# Patient Record
Sex: FEMALE | Race: WHITE | NOT HISPANIC OR LATINO | Employment: OTHER | URBAN - METROPOLITAN AREA
[De-identification: names, ages, dates, MRNs, and addresses within clinical notes are randomized per-mention and may not be internally consistent; named-entity substitution may affect disease eponyms.]

---

## 2025-01-14 ENCOUNTER — HOSPITAL ENCOUNTER (INPATIENT)
Facility: HOSPITAL | Age: OVER 89
LOS: 3 days | Discharge: HOME | DRG: 438 | End: 2025-01-17
Attending: EMERGENCY MEDICINE | Admitting: STUDENT IN AN ORGANIZED HEALTH CARE EDUCATION/TRAINING PROGRAM
Payer: MEDICARE

## 2025-01-14 ENCOUNTER — APPOINTMENT (OUTPATIENT)
Dept: RADIOLOGY | Facility: HOSPITAL | Age: OVER 89
DRG: 438 | End: 2025-01-14
Payer: MEDICARE

## 2025-01-14 ENCOUNTER — APPOINTMENT (OUTPATIENT)
Dept: CARDIOLOGY | Facility: HOSPITAL | Age: OVER 89
DRG: 438 | End: 2025-01-14
Payer: MEDICARE

## 2025-01-14 DIAGNOSIS — A09 GASTROINTESTINAL INFECTION: ICD-10-CM

## 2025-01-14 DIAGNOSIS — J18.9 COMMUNITY ACQUIRED PNEUMONIA, UNSPECIFIED LATERALITY: Primary | ICD-10-CM

## 2025-01-14 DIAGNOSIS — E83.42 HYPOMAGNESEMIA: ICD-10-CM

## 2025-01-14 DIAGNOSIS — K85.80 OTHER ACUTE PANCREATITIS, UNSPECIFIED COMPLICATION STATUS (HHS-HCC): ICD-10-CM

## 2025-01-14 DIAGNOSIS — I48.91 ATRIAL FIBRILLATION, UNSPECIFIED TYPE (MULTI): ICD-10-CM

## 2025-01-14 LAB
ALBUMIN SERPL BCP-MCNC: 2.8 G/DL (ref 3.4–5)
ALP SERPL-CCNC: 35 U/L (ref 33–136)
ALT SERPL W P-5'-P-CCNC: 8 U/L (ref 7–45)
ANION GAP SERPL CALC-SCNC: 14 MMOL/L (ref 10–20)
APPEARANCE UR: CLEAR
APTT PPP: 28 SECONDS (ref 27–38)
AST SERPL W P-5'-P-CCNC: 17 U/L (ref 9–39)
ATRIAL RATE: 97 BPM
BASOPHILS # BLD AUTO: 0.09 X10*3/UL (ref 0–0.1)
BASOPHILS NFR BLD AUTO: 0.4 %
BILIRUB SERPL-MCNC: 1.5 MG/DL (ref 0–1.2)
BILIRUB UR STRIP.AUTO-MCNC: NEGATIVE MG/DL
BNP SERPL-MCNC: 338 PG/ML (ref 0–99)
BUN SERPL-MCNC: 22 MG/DL (ref 6–23)
CALCIUM SERPL-MCNC: 8.1 MG/DL (ref 8.6–10.3)
CARDIAC TROPONIN I PNL SERPL HS: 10 NG/L (ref 0–13)
CARDIAC TROPONIN I PNL SERPL HS: 12 NG/L (ref 0–13)
CHLORIDE SERPL-SCNC: 105 MMOL/L (ref 98–107)
CO2 SERPL-SCNC: 23 MMOL/L (ref 21–32)
COLOR UR: YELLOW
CREAT SERPL-MCNC: 0.78 MG/DL (ref 0.5–1.05)
EGFRCR SERPLBLD CKD-EPI 2021: 71 ML/MIN/1.73M*2
EOSINOPHIL # BLD AUTO: 0.04 X10*3/UL (ref 0–0.4)
EOSINOPHIL NFR BLD AUTO: 0.2 %
ERYTHROCYTE [DISTWIDTH] IN BLOOD BY AUTOMATED COUNT: 13.9 % (ref 11.5–14.5)
ETHANOL SERPL-MCNC: <10 MG/DL
GLUCOSE SERPL-MCNC: 87 MG/DL (ref 74–99)
GLUCOSE UR STRIP.AUTO-MCNC: NORMAL MG/DL
HCT VFR BLD AUTO: 35 % (ref 36–46)
HGB BLD-MCNC: 11.5 G/DL (ref 12–16)
HOLD SPECIMEN: NORMAL
HOLD SPECIMEN: NORMAL
IMM GRANULOCYTES # BLD AUTO: 0.11 X10*3/UL (ref 0–0.5)
IMM GRANULOCYTES NFR BLD AUTO: 0.5 % (ref 0–0.9)
INR PPP: 1.6 (ref 0.9–1.1)
KETONES UR STRIP.AUTO-MCNC: ABNORMAL MG/DL
LACTATE SERPL-SCNC: 1.5 MMOL/L (ref 0.4–2)
LACTATE SERPL-SCNC: 2.7 MMOL/L (ref 0.4–2)
LEUKOCYTE ESTERASE UR QL STRIP.AUTO: NEGATIVE
LIPASE SERPL-CCNC: 34 U/L (ref 9–82)
LYMPHOCYTES # BLD AUTO: 0.46 X10*3/UL (ref 0.8–3)
LYMPHOCYTES NFR BLD AUTO: 2 %
MAGNESIUM SERPL-MCNC: 1.41 MG/DL (ref 1.6–2.4)
MCH RBC QN AUTO: 30 PG (ref 26–34)
MCHC RBC AUTO-ENTMCNC: 32.9 G/DL (ref 32–36)
MCV RBC AUTO: 91 FL (ref 80–100)
MONOCYTES # BLD AUTO: 0.72 X10*3/UL (ref 0.05–0.8)
MONOCYTES NFR BLD AUTO: 3.2 %
MRSA DNA SPEC QL NAA+PROBE: NOT DETECTED
MUCOUS THREADS #/AREA URNS AUTO: NORMAL /LPF
NEUTROPHILS # BLD AUTO: 21.02 X10*3/UL (ref 1.6–5.5)
NEUTROPHILS NFR BLD AUTO: 93.7 %
NITRITE UR QL STRIP.AUTO: NEGATIVE
NRBC BLD-RTO: 0 /100 WBCS (ref 0–0)
PH UR STRIP.AUTO: 5.5 [PH]
PLATELET # BLD AUTO: 300 X10*3/UL (ref 150–450)
POTASSIUM SERPL-SCNC: 4 MMOL/L (ref 3.5–5.3)
PROT SERPL-MCNC: 5.6 G/DL (ref 6.4–8.2)
PROT UR STRIP.AUTO-MCNC: ABNORMAL MG/DL
PROTHROMBIN TIME: 17.7 SECONDS (ref 9.8–12.8)
Q ONSET: 207 MS
QRS COUNT: 15 BEATS
QRS DURATION: 112 MS
QT INTERVAL: 376 MS
QTC CALCULATION(BAZETT): 464 MS
QTC FREDERICIA: 433 MS
R AXIS: 5 DEGREES
RBC # BLD AUTO: 3.83 X10*6/UL (ref 4–5.2)
RBC # UR STRIP.AUTO: NEGATIVE /UL
RBC #/AREA URNS AUTO: NORMAL /HPF
SODIUM SERPL-SCNC: 138 MMOL/L (ref 136–145)
SP GR UR STRIP.AUTO: >1.05
SQUAMOUS #/AREA URNS AUTO: NORMAL /HPF
T AXIS: 99 DEGREES
T OFFSET: 395 MS
UROBILINOGEN UR STRIP.AUTO-MCNC: NORMAL MG/DL
VENTRICULAR RATE: 92 BPM
WBC # BLD AUTO: 22.4 X10*3/UL (ref 4.4–11.3)
WBC #/AREA URNS AUTO: NORMAL /HPF

## 2025-01-14 PROCEDURE — 70450 CT HEAD/BRAIN W/O DYE: CPT | Performed by: RADIOLOGY

## 2025-01-14 PROCEDURE — 1210000001 HC SEMI-PRIVATE ROOM DAILY

## 2025-01-14 PROCEDURE — 85025 COMPLETE CBC W/AUTO DIFF WBC: CPT

## 2025-01-14 PROCEDURE — 36415 COLL VENOUS BLD VENIPUNCTURE: CPT

## 2025-01-14 PROCEDURE — 2550000001 HC RX 255 CONTRASTS

## 2025-01-14 PROCEDURE — 93970 EXTREMITY STUDY: CPT

## 2025-01-14 PROCEDURE — 83880 ASSAY OF NATRIURETIC PEPTIDE: CPT

## 2025-01-14 PROCEDURE — 71275 CT ANGIOGRAPHY CHEST: CPT | Mod: FOREIGN READ | Performed by: RADIOLOGY

## 2025-01-14 PROCEDURE — 87641 MR-STAPH DNA AMP PROBE: CPT

## 2025-01-14 PROCEDURE — 71275 CT ANGIOGRAPHY CHEST: CPT

## 2025-01-14 PROCEDURE — 85730 THROMBOPLASTIN TIME PARTIAL: CPT

## 2025-01-14 PROCEDURE — 84484 ASSAY OF TROPONIN QUANT: CPT

## 2025-01-14 PROCEDURE — 2500000004 HC RX 250 GENERAL PHARMACY W/ HCPCS (ALT 636 FOR OP/ED)

## 2025-01-14 PROCEDURE — 83605 ASSAY OF LACTIC ACID: CPT

## 2025-01-14 PROCEDURE — 83690 ASSAY OF LIPASE: CPT

## 2025-01-14 PROCEDURE — 96374 THER/PROPH/DIAG INJ IV PUSH: CPT | Mod: 59

## 2025-01-14 PROCEDURE — 70450 CT HEAD/BRAIN W/O DYE: CPT

## 2025-01-14 PROCEDURE — 74177 CT ABD & PELVIS W/CONTRAST: CPT | Mod: FOREIGN READ | Performed by: RADIOLOGY

## 2025-01-14 PROCEDURE — 71045 X-RAY EXAM CHEST 1 VIEW: CPT

## 2025-01-14 PROCEDURE — 99285 EMERGENCY DEPT VISIT HI MDM: CPT | Mod: 25 | Performed by: EMERGENCY MEDICINE

## 2025-01-14 PROCEDURE — 51701 INSERT BLADDER CATHETER: CPT

## 2025-01-14 PROCEDURE — 74177 CT ABD & PELVIS W/CONTRAST: CPT

## 2025-01-14 PROCEDURE — 99223 1ST HOSP IP/OBS HIGH 75: CPT | Performed by: STUDENT IN AN ORGANIZED HEALTH CARE EDUCATION/TRAINING PROGRAM

## 2025-01-14 PROCEDURE — 82077 ASSAY SPEC XCP UR&BREATH IA: CPT

## 2025-01-14 PROCEDURE — 83735 ASSAY OF MAGNESIUM: CPT

## 2025-01-14 PROCEDURE — 87075 CULTR BACTERIA EXCEPT BLOOD: CPT | Mod: ELYLAB

## 2025-01-14 PROCEDURE — 93971 EXTREMITY STUDY: CPT | Mod: FOREIGN READ | Performed by: RADIOLOGY

## 2025-01-14 PROCEDURE — 71045 X-RAY EXAM CHEST 1 VIEW: CPT | Mod: FOREIGN READ | Performed by: RADIOLOGY

## 2025-01-14 PROCEDURE — 2500000004 HC RX 250 GENERAL PHARMACY W/ HCPCS (ALT 636 FOR OP/ED): Mod: JZ | Performed by: STUDENT IN AN ORGANIZED HEALTH CARE EDUCATION/TRAINING PROGRAM

## 2025-01-14 PROCEDURE — 96361 HYDRATE IV INFUSION ADD-ON: CPT | Mod: 59

## 2025-01-14 PROCEDURE — 80053 COMPREHEN METABOLIC PANEL: CPT

## 2025-01-14 PROCEDURE — 93005 ELECTROCARDIOGRAM TRACING: CPT

## 2025-01-14 PROCEDURE — 81001 URINALYSIS AUTO W/SCOPE: CPT | Performed by: STUDENT IN AN ORGANIZED HEALTH CARE EDUCATION/TRAINING PROGRAM

## 2025-01-14 PROCEDURE — 96365 THER/PROPH/DIAG IV INF INIT: CPT | Mod: 59

## 2025-01-14 RX ORDER — ACETAMINOPHEN 160 MG/5ML
650 SOLUTION ORAL EVERY 4 HOURS PRN
Status: DISCONTINUED | OUTPATIENT
Start: 2025-01-14 | End: 2025-01-17 | Stop reason: HOSPADM

## 2025-01-14 RX ORDER — AMOXICILLIN 250 MG
1 CAPSULE ORAL 2 TIMES DAILY
Status: ON HOLD | COMMUNITY
Start: 2025-01-01 | End: 2025-01-15

## 2025-01-14 RX ORDER — VANCOMYCIN HYDROCHLORIDE 1 G/200ML
1 INJECTION, SOLUTION INTRAVENOUS ONCE
Status: COMPLETED | OUTPATIENT
Start: 2025-01-14 | End: 2025-01-14

## 2025-01-14 RX ORDER — NYSTATIN 100000 [USP'U]/G
1 POWDER TOPICAL 2 TIMES DAILY
Status: DISCONTINUED | OUTPATIENT
Start: 2025-01-14 | End: 2025-01-17 | Stop reason: HOSPADM

## 2025-01-14 RX ORDER — MIRTAZAPINE 15 MG/1
1 TABLET, FILM COATED ORAL NIGHTLY
COMMUNITY
Start: 2025-01-10

## 2025-01-14 RX ORDER — NYSTATIN 100000 U/G
1 OINTMENT TOPICAL 2 TIMES DAILY
Status: ON HOLD | COMMUNITY
Start: 2024-11-20 | End: 2025-01-15

## 2025-01-14 RX ORDER — POLYETHYLENE GLYCOL 3350 17 G/17G
34 POWDER, FOR SOLUTION ORAL
Status: ON HOLD | COMMUNITY
Start: 2025-01-02 | End: 2025-01-15

## 2025-01-14 RX ORDER — METRONIDAZOLE 500 MG/100ML
500 INJECTION, SOLUTION INTRAVENOUS EVERY 8 HOURS
Status: DISCONTINUED | OUTPATIENT
Start: 2025-01-14 | End: 2025-01-17 | Stop reason: HOSPADM

## 2025-01-14 RX ORDER — METOPROLOL SUCCINATE 25 MG/1
37.5 TABLET, EXTENDED RELEASE ORAL DAILY
COMMUNITY

## 2025-01-14 RX ORDER — CLOTRIMAZOLE AND BETAMETHASONE DIPROPIONATE 10; .64 MG/G; MG/G
1 CREAM TOPICAL 2 TIMES DAILY
Status: ON HOLD | COMMUNITY
Start: 2024-09-05 | End: 2025-01-15

## 2025-01-14 RX ORDER — POLYETHYLENE GLYCOL 3350 17 G/17G
17 POWDER, FOR SOLUTION ORAL DAILY PRN
Status: DISCONTINUED | OUTPATIENT
Start: 2025-01-14 | End: 2025-01-17 | Stop reason: HOSPADM

## 2025-01-14 RX ORDER — ROSUVASTATIN CALCIUM 10 MG/1
10 TABLET, COATED ORAL NIGHTLY
COMMUNITY
Start: 2025-01-02

## 2025-01-14 RX ORDER — ACETAMINOPHEN 650 MG/1
650 SUPPOSITORY RECTAL EVERY 4 HOURS PRN
Status: DISCONTINUED | OUTPATIENT
Start: 2025-01-14 | End: 2025-01-17 | Stop reason: HOSPADM

## 2025-01-14 RX ORDER — GENTAMICIN SULFATE 1 MG/G
OINTMENT TOPICAL 3 TIMES DAILY
Status: ON HOLD | COMMUNITY
Start: 2024-11-20 | End: 2025-01-15

## 2025-01-14 RX ORDER — OMEPRAZOLE 20 MG/1
20 TABLET, DELAYED RELEASE ORAL
COMMUNITY

## 2025-01-14 RX ORDER — ASPIRIN 325 MG
325 TABLET ORAL DAILY
Status: ON HOLD | COMMUNITY
End: 2025-01-15

## 2025-01-14 RX ORDER — MAGNESIUM SULFATE HEPTAHYDRATE 40 MG/ML
2 INJECTION, SOLUTION INTRAVENOUS ONCE
Status: COMPLETED | OUTPATIENT
Start: 2025-01-14 | End: 2025-01-14

## 2025-01-14 RX ORDER — ACETAMINOPHEN 325 MG/1
650 TABLET ORAL EVERY 4 HOURS PRN
Status: DISCONTINUED | OUTPATIENT
Start: 2025-01-14 | End: 2025-01-17 | Stop reason: HOSPADM

## 2025-01-14 RX ORDER — ACETAMINOPHEN 500 MG
5 TABLET ORAL NIGHTLY PRN
Status: DISCONTINUED | OUTPATIENT
Start: 2025-01-14 | End: 2025-01-17 | Stop reason: HOSPADM

## 2025-01-14 RX ORDER — ALUMINUM HYDROXIDE, MAGNESIUM HYDROXIDE, AND SIMETHICONE 1200; 120; 1200 MG/30ML; MG/30ML; MG/30ML
30 SUSPENSION ORAL EVERY 6 HOURS PRN
Status: ON HOLD | COMMUNITY
Start: 2025-01-01 | End: 2025-01-15

## 2025-01-14 RX ADMIN — MAGNESIUM SULFATE HEPTAHYDRATE 2 G: 40 INJECTION, SOLUTION INTRAVENOUS at 16:41

## 2025-01-14 RX ADMIN — METRONIDAZOLE 500 MG: 500 INJECTION, SOLUTION INTRAVENOUS at 22:06

## 2025-01-14 RX ADMIN — VANCOMYCIN HYDROCHLORIDE 1 G: 1 INJECTION, SOLUTION INTRAVENOUS at 17:46

## 2025-01-14 RX ADMIN — IOHEXOL 75 ML: 350 INJECTION, SOLUTION INTRAVENOUS at 16:09

## 2025-01-14 RX ADMIN — AZITHROMYCIN MONOHYDRATE 490.2 MG: 500 INJECTION, POWDER, LYOPHILIZED, FOR SOLUTION INTRAVENOUS at 17:25

## 2025-01-14 RX ADMIN — SODIUM CHLORIDE 500 ML: 9 INJECTION, SOLUTION INTRAVENOUS at 14:39

## 2025-01-14 RX ADMIN — CEFEPIME 2 G: 2 INJECTION, POWDER, FOR SOLUTION INTRAVENOUS at 16:40

## 2025-01-14 SDOH — ECONOMIC STABILITY: HOUSING INSECURITY: AT ANY TIME IN THE PAST 12 MONTHS, WERE YOU HOMELESS OR LIVING IN A SHELTER (INCLUDING NOW)?: NO

## 2025-01-14 SDOH — SOCIAL STABILITY: SOCIAL INSECURITY: DOES ANYONE TRY TO KEEP YOU FROM HAVING/CONTACTING OTHER FRIENDS OR DOING THINGS OUTSIDE YOUR HOME?: NO

## 2025-01-14 SDOH — SOCIAL STABILITY: SOCIAL INSECURITY: WITHIN THE LAST YEAR, HAVE YOU BEEN HUMILIATED OR EMOTIONALLY ABUSED IN OTHER WAYS BY YOUR PARTNER OR EX-PARTNER?: NO

## 2025-01-14 SDOH — ECONOMIC STABILITY: FOOD INSECURITY: HOW HARD IS IT FOR YOU TO PAY FOR THE VERY BASICS LIKE FOOD, HOUSING, MEDICAL CARE, AND HEATING?: NOT VERY HARD

## 2025-01-14 SDOH — SOCIAL STABILITY: SOCIAL INSECURITY: ARE YOU OR HAVE YOU BEEN THREATENED OR ABUSED PHYSICALLY, EMOTIONALLY, OR SEXUALLY BY ANYONE?: NO

## 2025-01-14 SDOH — SOCIAL STABILITY: SOCIAL INSECURITY: WERE YOU ABLE TO COMPLETE ALL THE BEHAVIORAL HEALTH SCREENINGS?: YES

## 2025-01-14 SDOH — SOCIAL STABILITY: SOCIAL INSECURITY
WITHIN THE LAST YEAR, HAVE YOU BEEN RAPED OR FORCED TO HAVE ANY KIND OF SEXUAL ACTIVITY BY YOUR PARTNER OR EX-PARTNER?: NO

## 2025-01-14 SDOH — SOCIAL STABILITY: SOCIAL INSECURITY: HAVE YOU HAD THOUGHTS OF HARMING ANYONE ELSE?: NO

## 2025-01-14 SDOH — SOCIAL STABILITY: SOCIAL INSECURITY: WITHIN THE LAST YEAR, HAVE YOU BEEN AFRAID OF YOUR PARTNER OR EX-PARTNER?: NO

## 2025-01-14 SDOH — ECONOMIC STABILITY: TRANSPORTATION INSECURITY: IN THE PAST 12 MONTHS, HAS LACK OF TRANSPORTATION KEPT YOU FROM MEDICAL APPOINTMENTS OR FROM GETTING MEDICATIONS?: NO

## 2025-01-14 SDOH — SOCIAL STABILITY: SOCIAL INSECURITY: ARE THERE ANY APPARENT SIGNS OF INJURIES/BEHAVIORS THAT COULD BE RELATED TO ABUSE/NEGLECT?: NO

## 2025-01-14 SDOH — ECONOMIC STABILITY: INCOME INSECURITY: IN THE PAST 12 MONTHS HAS THE ELECTRIC, GAS, OIL, OR WATER COMPANY THREATENED TO SHUT OFF SERVICES IN YOUR HOME?: NO

## 2025-01-14 SDOH — ECONOMIC STABILITY: HOUSING INSECURITY: IN THE LAST 12 MONTHS, WAS THERE A TIME WHEN YOU WERE NOT ABLE TO PAY THE MORTGAGE OR RENT ON TIME?: NO

## 2025-01-14 SDOH — SOCIAL STABILITY: SOCIAL INSECURITY: HAVE YOU HAD ANY THOUGHTS OF HARMING ANYONE ELSE?: NO

## 2025-01-14 SDOH — SOCIAL STABILITY: SOCIAL INSECURITY
WITHIN THE LAST YEAR, HAVE YOU BEEN KICKED, HIT, SLAPPED, OR OTHERWISE PHYSICALLY HURT BY YOUR PARTNER OR EX-PARTNER?: NO

## 2025-01-14 SDOH — ECONOMIC STABILITY: FOOD INSECURITY: WITHIN THE PAST 12 MONTHS, THE FOOD YOU BOUGHT JUST DIDN'T LAST AND YOU DIDN'T HAVE MONEY TO GET MORE.: NEVER TRUE

## 2025-01-14 SDOH — SOCIAL STABILITY: SOCIAL INSECURITY: ABUSE: ADULT

## 2025-01-14 SDOH — ECONOMIC STABILITY: FOOD INSECURITY: WITHIN THE PAST 12 MONTHS, YOU WORRIED THAT YOUR FOOD WOULD RUN OUT BEFORE YOU GOT THE MONEY TO BUY MORE.: NEVER TRUE

## 2025-01-14 SDOH — ECONOMIC STABILITY: HOUSING INSECURITY: IN THE PAST 12 MONTHS, HOW MANY TIMES HAVE YOU MOVED WHERE YOU WERE LIVING?: 0

## 2025-01-14 SDOH — SOCIAL STABILITY: SOCIAL INSECURITY: DO YOU FEEL ANYONE HAS EXPLOITED OR TAKEN ADVANTAGE OF YOU FINANCIALLY OR OF YOUR PERSONAL PROPERTY?: NO

## 2025-01-14 SDOH — SOCIAL STABILITY: SOCIAL INSECURITY: DO YOU FEEL UNSAFE GOING BACK TO THE PLACE WHERE YOU ARE LIVING?: NO

## 2025-01-14 SDOH — SOCIAL STABILITY: SOCIAL INSECURITY: HAS ANYONE EVER THREATENED TO HURT YOUR FAMILY OR YOUR PETS?: NO

## 2025-01-14 ASSESSMENT — COGNITIVE AND FUNCTIONAL STATUS - GENERAL
HELP NEEDED FOR BATHING: A LITTLE
DRESSING REGULAR UPPER BODY CLOTHING: A LITTLE
PATIENT BASELINE BEDBOUND: NO
MOVING TO AND FROM BED TO CHAIR: A LOT
WALKING IN HOSPITAL ROOM: A LOT
MOVING FROM LYING ON BACK TO SITTING ON SIDE OF FLAT BED WITH BEDRAILS: A LITTLE
CLIMB 3 TO 5 STEPS WITH RAILING: A LOT
DAILY ACTIVITIY SCORE: 19
DRESSING REGULAR LOWER BODY CLOTHING: A LITTLE
STANDING UP FROM CHAIR USING ARMS: A LOT
MOBILITY SCORE: 14
TOILETING: A LOT
TURNING FROM BACK TO SIDE WHILE IN FLAT BAD: A LITTLE

## 2025-01-14 ASSESSMENT — ACTIVITIES OF DAILY LIVING (ADL)
LACK_OF_TRANSPORTATION: NO
DRESSING YOURSELF: NEEDS ASSISTANCE
ADEQUATE_TO_COMPLETE_ADL: YES
FEEDING YOURSELF: INDEPENDENT
HEARING - RIGHT EAR: FUNCTIONAL
ASSISTIVE_DEVICE: NONE;WALKER
BATHING: NEEDS ASSISTANCE
GROOMING: NEEDS ASSISTANCE
JUDGMENT_ADEQUATE_SAFELY_COMPLETE_DAILY_ACTIVITIES: YES
HEARING - LEFT EAR: FUNCTIONAL
WALKS IN HOME: INDEPENDENT
TOILETING: NEEDS ASSISTANCE
PATIENT'S MEMORY ADEQUATE TO SAFELY COMPLETE DAILY ACTIVITIES?: NO

## 2025-01-14 ASSESSMENT — PAIN - FUNCTIONAL ASSESSMENT: PAIN_FUNCTIONAL_ASSESSMENT: 0-10

## 2025-01-14 ASSESSMENT — PATIENT HEALTH QUESTIONNAIRE - PHQ9
1. LITTLE INTEREST OR PLEASURE IN DOING THINGS: SEVERAL DAYS
SUM OF ALL RESPONSES TO PHQ9 QUESTIONS 1 & 2: 2
2. FEELING DOWN, DEPRESSED OR HOPELESS: SEVERAL DAYS

## 2025-01-14 ASSESSMENT — PAIN SCALES - GENERAL
PAINLEVEL_OUTOF10: 0 - NO PAIN
PAINLEVEL_OUTOF10: 0 - NO PAIN

## 2025-01-14 ASSESSMENT — LIFESTYLE VARIABLES
HAVE PEOPLE ANNOYED YOU BY CRITICIZING YOUR DRINKING: NO
TOTAL SCORE: 0
HOW MANY STANDARD DRINKS CONTAINING ALCOHOL DO YOU HAVE ON A TYPICAL DAY: PATIENT DOES NOT DRINK
HOW OFTEN DO YOU HAVE 6 OR MORE DRINKS ON ONE OCCASION: NEVER
AUDIT-C TOTAL SCORE: 0
SKIP TO QUESTIONS 9-10: 1
EVER HAD A DRINK FIRST THING IN THE MORNING TO STEADY YOUR NERVES TO GET RID OF A HANGOVER: NO
HOW OFTEN DO YOU HAVE A DRINK CONTAINING ALCOHOL: NEVER
AUDIT-C TOTAL SCORE: 0
EVER FELT BAD OR GUILTY ABOUT YOUR DRINKING: NO
HAVE YOU EVER FELT YOU SHOULD CUT DOWN ON YOUR DRINKING: NO

## 2025-01-14 ASSESSMENT — COLUMBIA-SUICIDE SEVERITY RATING SCALE - C-SSRS
1. IN THE PAST MONTH, HAVE YOU WISHED YOU WERE DEAD OR WISHED YOU COULD GO TO SLEEP AND NOT WAKE UP?: NO
2. HAVE YOU ACTUALLY HAD ANY THOUGHTS OF KILLING YOURSELF?: NO
6. HAVE YOU EVER DONE ANYTHING, STARTED TO DO ANYTHING, OR PREPARED TO DO ANYTHING TO END YOUR LIFE?: NO

## 2025-01-14 NOTE — H&P
Medical Group History and Physical  ASSESSMENT & PLAN:     Acute pancreatitis  Hemorrhagic pancreatic pseudocyst  - Presenting from home with chief complaints of generalized weakness, shortness of breath however does have abdominal pain and tenderness to deep palpation  - CT abdomen/pelvis with concerns for acute pancreatitis along with hemorrhagic pseudocyst and smaller pseudocyst  - GI consulted  - Continue IV antibiotics at this time to cover for pancreatic necrosis  until further evaluation with cefepime, metronidazole    Pulmonary fibrosis  - As seen on CTA chest  - Pulmonology consulted for further assessment, will likely need outpatient PFTs etc    Alcohol use disorder  - Has not drink any alcohol since her most recent admission at [facility]    Leukocytosis, likely reactive to above    Essential hypertension  Hyperlipidemia  Type II DM  - Resume home medications once reconciled    Spoke with patient's 2 daughters at bedside.    VTE Prophylaxis: SCDs (holding chemoprophylaxis in setting of hemorrhagic pseudocyst)      ---Of note, this documentation is completed using the Dragon Dictation system (voice recognition software). There may be spelling and/or grammatical errors that were not corrected prior to final submission.---    Brady Betancourt MD    HISTORY OF PRESENT ILLNESS:   Chief Complaint: SOB    History Of Present Illness:    Mary Burden is a 93 y.o. female with a significant past medical history of GERD, previous alcohol use disorder, hypertension, hyperlipidemia, recent admission at [facility] for acute pancreatitis presented from home with chief complaints of shortness of breath, weakness, on exam patient endorses that her shortness of breath is improved with oxygen.  States having some abdominal pain which has improved.  Denies having any alcohol use since her discharge from [facility] on 1/1.    Recent hospitalization, [facility] 12/28 to 1/1:  Admitted for acute pancreatitis likely in setting of  "alcohol use.  Also found to have cholelithiasis.  Seen by GI/general surgery who recommended outpatient follow-up and further intervention.  CT abdomen/pelvis at that time showed acute pancreatitis otherwise unremarkable.    ED course:  - Vitals: Temperature 97.3, , /87, RR 18 saturating 91% on 2 L nasal cannula  - Labs: WBC count of 22 otherwise unremarkable CBC.  Bilirubin at 1.5 magnesium 1.4 otherwise unremarkable CMP.  Lactate of 2.7 which improved to 1.5.  Lipase of 34.  - Imaging: CT abdomen/pelvis with IV contrast with acute pancreatitis, also with a large 5.2 x 4.4 x 9.9 cm hemorrhagic pseudocyst with other smaller pseudocyst seen in the pancreatic head.  CTA chest negative for PE.  Interstitial markings and chronic fibrosis seen.  Thoracic aorta dilatation also seen.     Review of systems: 10 point review of systems is otherwise negative except as mentioned above.    PAST HISTORIES:     Past Medical History:  She has a past medical history of Hypertension and Pancreatitis (HHS-HCC).    Past Surgical History:  She has no past surgical history on file.      Social History:  She reports that she has never smoked. She has never been exposed to tobacco smoke. She has never used smokeless tobacco. She reports that she does not currently use alcohol. She reports that she does not use drugs.    Family History:  No family history on file.     Allergies:  Amoxicillin and Codeine    OBJECTIVE:     Last Recorded Vitals:  Vitals:    01/14/25 1419 01/14/25 1424 01/14/25 1646 01/14/25 1749   BP: 134/87  114/59 101/55   BP Location: Right arm  Right arm Right arm   Patient Position: Lying  Lying Lying   Pulse: (!) 113  82 87   Resp: 20  20 20   Temp: 36.3 °C (97.3 °F)      TempSrc: Temporal      SpO2: (!) 91% (!) 91% 100% 100%   Weight: 45.4 kg (100 lb)      Height: 1.6 m (5' 3\")        Last I/O:  No intake/output data recorded.    Physical Exam  Vitals reviewed.   Constitutional:       General: She is not in " acute distress.     Appearance: Normal appearance. She is not toxic-appearing.   HENT:      Head: Normocephalic and atraumatic.      Nose: Nose normal.      Mouth/Throat:      Mouth: Mucous membranes are moist.      Pharynx: Oropharynx is clear.      Comments: No upper teeth, lower teeth missing and have cavities/decay.  Eyes:      Extraocular Movements: Extraocular movements intact.      Conjunctiva/sclera: Conjunctivae normal.      Pupils: Pupils are equal, round, and reactive to light.   Cardiovascular:      Rate and Rhythm: Normal rate and regular rhythm.      Pulses: Normal pulses.      Heart sounds: Normal heart sounds.   Pulmonary:      Effort: Pulmonary effort is normal. No respiratory distress.      Breath sounds: No wheezing.      Comments: No acute respiratory distress.  Breath sounds diminished to auscultation bilaterally.  Abdominal:      General: Bowel sounds are normal. There is no distension.      Palpations: Abdomen is soft.      Tenderness: There is abdominal tenderness. There is no guarding.      Comments: Abdomen soft, bowel sounds are present.  Diffusely tender to deep palpation.  No rebound or guarding.   Musculoskeletal:         General: Normal range of motion.      Cervical back: Normal range of motion and neck supple.   Neurological:      General: No focal deficit present.      Mental Status: She is alert and oriented to person, place, and time. Mental status is at baseline.   Psychiatric:         Mood and Affect: Mood normal.         Behavior: Behavior normal.         Thought Content: Thought content normal.         Scheduled Medications  azithromycin, 500 mg, intravenous, Once  magnesium sulfate, 2 g, intravenous, Once  vancomycin, 1 g, intravenous, Once      PRN Medications    Continuous Medications       Outpatient Medications:  Prior to Admission medications    Not on File       LABS AND IMAGING:     Labs:  Results from last 7 days   Lab Units 01/14/25  1425   WBC AUTO x10*3/uL 22.4*    RBC AUTO x10*6/uL 3.83*   HEMOGLOBIN g/dL 11.5*   HEMATOCRIT % 35.0*   MCV fL 91   MCH pg 30.0   MCHC g/dL 32.9   RDW % 13.9   PLATELETS AUTO x10*3/uL 300     Results from last 7 days   Lab Units 01/14/25  1425   SODIUM mmol/L 138   POTASSIUM mmol/L 4.0   CHLORIDE mmol/L 105   CO2 mmol/L 23   BUN mg/dL 22   CREATININE mg/dL 0.78   GLUCOSE mg/dL 87   PROTEIN TOTAL g/dL 5.6*   CALCIUM mg/dL 8.1*   BILIRUBIN TOTAL mg/dL 1.5*   ALK PHOS U/L 35   AST U/L 17   ALT U/L 8     Results from last 7 days   Lab Units 01/14/25  1425   MAGNESIUM mg/dL 1.41*     Results from last 7 days   Lab Units 01/14/25  1620 01/14/25  1425   TROPHS ng/L 12 10       Imaging:  CT abdomen pelvis w IV contrast  Narrative: STUDY:  CT Angiogram of the Chest, CT Abdomen and Pelvis with IV Contrast;  01/14/2025 at 4:12 PM  INDICATION:  Shortness of breath. Leg swelling. Lower abdominal pain.   COMPARISON:  XR chest 01/14/25.  ACCESSION NUMBER(S):  [id number], [id number]  ORDERING CLINICIAN:  MARK VOGEL  TECHNIQUE:  CTA of the chest was performed following rapid injection  of intravenous contrast.  Images are reviewed and processed at a  workstation according to the CT angiogram protocol with 3-D and/or MIP  post processing imaging generated.  CT of the abdomen and pelvis was  performed with intravenous contrast.  Omnipaque-350 75 mL was  administered intravenously; positive oral contrast was given.  Automated mA/kV exposure control was utilized and patient examination  was performed in strict accordance with principles of ALARA.  FINDINGS:    No filling defects are seen within the main pulmonary artery, left or  right pulmonary arteries, or first order branches to indicate an  embolus.  No early filling veins are visualized indicate an  arteriovenous malformation.  Emphysematous changes are seen within the  lungs bilaterally.  There are peripheral interstitial markings  bilaterally, which may indicate a chronic process such as fibrosis.    There is a tiny right pleural effusion.  No enlarged lymph nodes are  seen in the mediastinal or hilar regions.  There is no pericardial  effusion.  Coronary artery calcifications are present.  There is  aneurysmal dilatation of the ascending aorta at 4.3 x 4.0 cm.  The  aortic arch is measured at 3.4 cm.  The proximal descending thoracic  aorta is measured at 3.2 cm.  The distal descending thoracic aorta is  measured at 2.6 cm.  Degenerative changes are seen throughout the  thoracic spine.    There is a nodular contour of the liver, indicative of cirrhosis.   There appears be partial cavernous transformation of the portal vein.  The hepatic veins appear patent.  Gallstones are present.  There is  moderate distention of the gallbladder.  The spleen is not enlarged.   There appears to be chronic thrombosis of the splenic vein.  There are  inflammatory changes surrounding the pancreas, indicative of acute  hepatitis.  There are multiple fluid collections are visualized,  indicative of cirrhosis.  There appears be a hemorrhagic pseudocyst  posterior to the head measured at 5.2 x 4.4 x 9.9 cm(Series 602, Image  58; Series 601, Image 53).  The hemorrhagic component is seen in the  superior aspect of the pseudocyst is estimated 1.8 x 2.0 cm.  A  smaller pseudocyst is seen anterior to the pancreatic head measured at  1.5 x 1.6 cm(Series 601, Image 49).  No adrenal nodule is noted.  No  acute findings are seen within either kidney.  There is a mild to  moderate amount of abdominal ascites.  No dilated loops of small bowel  or colon are visually.  There are diverticula seen throughout the  colon.  There is no evidence of diverticulitis.  The appendix is  unremarkable in appearance.  No enlarged lymph nodes are seen in the  pelvic sidewalls, iliac chains, or retroperitoneum.  There is  aneurysmal dilatation of the abdominal aorta at 3.2 cm.  No prominent  edema is seen within the psoas musculature.  There is  generalized  anasarca.  Degenerative changes are seen throughout the lumbar spine.  Impression: CHEST:  1.  No evidence of a pulmonary embolus.  2.  Emphysematous changes.  3.  Peripheral interstitial markings, most likely a chronic process  such as fibrosis.  4.  Tiny right pleural effusion.  5.  Coronary artery calcifications.  6.  Aneurysmal dilatation of the thoracic aorta.  ABDOMEN/PELVIS:  1.  Acute pancreatitis, with development of pseudocysts off of the  pancreatic head.  The larger pseudocyst displays a small focus of  internal hemorrhage.  Correlation with serum CBC is recommended.  2.  Cirrhotic morphology of the liver, with cavernous transformation  of the portal vein.  3.  Chronic thrombosis of the splenic vein.  4.  Cholelithiasis.  5.  Mild to moderate abdominal ascites.  6.  Anasarca.  7.  Aneurysmal dilatation of the abdominal aorta.  8.  Colonic diverticulosis, no evidence of diverticulitis.  This report was called to Dr. Mark Vogel at 4:55 PM on January 14, 2025.  Signed by Jonathan Farias MD  CT angio chest for pulmonary embolism  Narrative: STUDY:  CT Angiogram of the Chest, CT Abdomen and Pelvis with IV Contrast;  01/14/2025 at 4:12 PM  INDICATION:  Shortness of breath. Leg swelling. Lower abdominal pain.   COMPARISON:  XR chest 01/14/25.  ACCESSION NUMBER(S):  [id number], [id number]  ORDERING CLINICIAN:  MARK VOGEL  TECHNIQUE:  CTA of the chest was performed following rapid injection  of intravenous contrast.  Images are reviewed and processed at a  workstation according to the CT angiogram protocol with 3-D and/or MIP  post processing imaging generated.  CT of the abdomen and pelvis was  performed with intravenous contrast.  Omnipaque-350 75 mL was  administered intravenously; positive oral contrast was given.  Automated mA/kV exposure control was utilized and patient examination  was performed in strict accordance with principles of ALARA.  FINDINGS:    No filling defects are  seen within the main pulmonary artery, left or  right pulmonary arteries, or first order branches to indicate an  embolus.  No early filling veins are visualized indicate an  arteriovenous malformation.  Emphysematous changes are seen within the  lungs bilaterally.  There are peripheral interstitial markings  bilaterally, which may indicate a chronic process such as fibrosis.   There is a tiny right pleural effusion.  No enlarged lymph nodes are  seen in the mediastinal or hilar regions.  There is no pericardial  effusion.  Coronary artery calcifications are present.  There is  aneurysmal dilatation of the ascending aorta at 4.3 x 4.0 cm.  The  aortic arch is measured at 3.4 cm.  The proximal descending thoracic  aorta is measured at 3.2 cm.  The distal descending thoracic aorta is  measured at 2.6 cm.  Degenerative changes are seen throughout the  thoracic spine.    There is a nodular contour of the liver, indicative of cirrhosis.   There appears be partial cavernous transformation of the portal vein.  The hepatic veins appear patent.  Gallstones are present.  There is  moderate distention of the gallbladder.  The spleen is not enlarged.   There appears to be chronic thrombosis of the splenic vein.  There are  inflammatory changes surrounding the pancreas, indicative of acute  hepatitis.  There are multiple fluid collections are visualized,  indicative of cirrhosis.  There appears be a hemorrhagic pseudocyst  posterior to the head measured at 5.2 x 4.4 x 9.9 cm(Series 602, Image  58; Series 601, Image 53).  The hemorrhagic component is seen in the  superior aspect of the pseudocyst is estimated 1.8 x 2.0 cm.  A  smaller pseudocyst is seen anterior to the pancreatic head measured at  1.5 x 1.6 cm(Series 601, Image 49).  No adrenal nodule is noted.  No  acute findings are seen within either kidney.  There is a mild to  moderate amount of abdominal ascites.  No dilated loops of small bowel  or colon are visually.   There are diverticula seen throughout the  colon.  There is no evidence of diverticulitis.  The appendix is  unremarkable in appearance.  No enlarged lymph nodes are seen in the  pelvic sidewalls, iliac chains, or retroperitoneum.  There is  aneurysmal dilatation of the abdominal aorta at 3.2 cm.  No prominent  edema is seen within the psoas musculature.  There is generalized  anasarca.  Degenerative changes are seen throughout the lumbar spine.  Impression: CHEST:  1.  No evidence of a pulmonary embolus.  2.  Emphysematous changes.  3.  Peripheral interstitial markings, most likely a chronic process  such as fibrosis.  4.  Tiny right pleural effusion.  5.  Coronary artery calcifications.  6.  Aneurysmal dilatation of the thoracic aorta.  ABDOMEN/PELVIS:  1.  Acute pancreatitis, with development of pseudocysts off of the  pancreatic head.  The larger pseudocyst displays a small focus of  internal hemorrhage.  Correlation with serum CBC is recommended.  2.  Cirrhotic morphology of the liver, with cavernous transformation  of the portal vein.  3.  Chronic thrombosis of the splenic vein.  4.  Cholelithiasis.  5.  Mild to moderate abdominal ascites.  6.  Anasarca.  7.  Aneurysmal dilatation of the abdominal aorta.  8.  Colonic diverticulosis, no evidence of diverticulitis.  This report was called to Dr. Mark Vogel at 4:55 PM on January 14, 2025.  Signed by Jonathan Farias MD  CT head wo IV contrast  Narrative: Interpreted By:  Marina Hawthorne,   STUDY:  CT HEAD WO IV CONTRAST; ;  1/14/2025 4:14 pm      INDICATION:  Signs/Symptoms:tingling hands.      COMPARISON:  01/30/2007      ACCESSION NUMBER(S):  [id number]      ORDERING CLINICIAN:  MARK VOGEL      TECHNIQUE:  Serial axial images of the head were obtained without intravenous  contrast. Sagittal and coronal reconstructions were generated.      FINDINGS:  The ventricles are midline and normal in size.      There are no acute parenchymal abnormalities.      There  is no hemorrhage or extra-axial fluid.      There is no obvious scalp hematoma or skull fracture.      The visualized portions of the paranasal sinuses and mastoids are  unremarkable.      There is no obvious skull fracture or scalp hematoma.      COMPARISON OF FINDINGS:  The brain is similar.      Impression: No acute intracranial abnormality.          MACRO:  none      Signed by: Marina Hawthorne 1/14/2025 4:17 PM  Dictation workstation:   [id number]  Vascular US lower extremity venous duplex bilateral  Narrative: STUDY:  Bilateral Lower Extremity Venous Doppler Ultrasound; 1/14/2025 3:32 PM  INDICATION:  BLE edema.  COMPARISON:  None available.  ACCESSION NUMBER(S):  [id number]  ORDERING CLINICIAN:  MARK VOGEL  TECHNIQUE:    Real-time grayscale imaging, color Doppler flow imaging, and spectral  Doppler imaging of the bilateral lower extremity veins was performed.  FINDINGS:   The bilateral common femoral, profunda femoral, femoral and popliteal  veins demonstrated normal compressibility, normal phasic venous flow  and normal response to augmentation.  There is no evidence for  echogenic thrombi.  The visualized deep calf veins are patent.    Impression: No evidence for DVT within the bilateral lower extremities.  Signed by Javad Graff MD  XR chest 1 view  Narrative: STUDY:  Chest Radiograph;  1/14/2025 2:27PM  INDICATION:  Shortness of breath.  COMPARISON:  None Available  ACCESSION NUMBER(S):  [id number]  ORDERING CLINICIAN:  MARK VOGEL  TECHNIQUE:  Frontal chest was obtained at 14:25 hours.  FINDINGS:  CARDIOMEDIASTINAL SILHOUETTE:  Cardiomediastinal silhouette is limits of normal for technique..     LUNGS:  There is infiltrate in the right lower lobe.  There is blunting the  right costophrenic angle.     ABDOMEN:  No remarkable upper abdominal findings.     BONES:  No acute osseous changes.  There is an old fracture the right  clavicle.  Impression: Right lower lobe infiltrate is small right  pleural effusion.  Signed by Raymond Gillis MD  ECG 12 lead  Atrial fibrillation  Nonspecific ST and T wave abnormality  Abnormal ECG  No previous ECGs available

## 2025-01-14 NOTE — ED PROVIDER NOTES
HPI   No chief complaint on file.      History provided by: Patient    Limitations to history: None    CC: Shortness of breath    HPI: 93-year-old female with a history of alcohol use, alcohol-induced pancreatitis, prolonged QT syndrome, hypertension, hyperlipidemia presents the emergency department to be evaluated for shortness of breath.  Patient states that for the last few days has been feeling short of breath with exertion.  She denies cough runny nose, congestion, sore throat.  Denies fever and chills.  She denies history of CAD, she is not sure when her last stress test or catheterization was but she has never required stent placement or bypass.  She denies history of heart failure or the use of diuretics, she does report some swelling in her lower extremities but denies redness warmth or bruising.  Denies numbness or tingling.  Denies history of DVTs or PEs, denies recent plane flights, recent surgeries, history of cancer, use of estrogen.  Denies pleuritic pain or hemoptysis.  Denies palpitations or having chest pain.  Patient denies history of asthma or the use of breathing treatments.  Patient states that earlier today she also felt generally shaky and was having some tingling in her bilateral hands.  Denies numbness but denies weakness in the extremities.  Denies slurred speech or facial drooping.  Denies all GI and  complaints.  Denies all other systemic symptoms.    ROS: Negative unless mentioned in HPI    Medical Hx: Denies allergies.  Immunizations up-to-date.    Physical exam:    Constitutional: Patient is well-nourished and well-developed.  Sitting comfortably in the room and in no distress.  Oriented to person, place, time, and situation.    HEENT: Head is normocephalic, atraumatic. Patient's airway is patent.  Tympanic membranes are clear bilaterally.  Nasal mucosa clear.  Mouth with normal mucosa.  Throat is not erythematous and there are no oropharyngeal exudates, uvula is midline.  No  obvious facial deformities.    Eyes: Clear bilaterally.  Pupils are equal round and reactive to light and accommodation.  Extraocular movements intact.      Cardiac: Regular rate, irregular rhythm.  Heart sounds S1, S2.  No murmurs, rubs, or gallops.  PMI nondisplaced.  No JVD.    Respiratory: Regular respiratory rate and effort.  Breath sounds are clear and equal bilaterally, no adventitious lung sounds.  Patient is speaking in full sentences and is in no apparent respiratory distress. No use of accessory muscles.      Gastrointestinal: Abdomen is soft, nondistended, and nontender.  There are no obvious deformities.  No rebound tenderness or guarding.  Bowel sounds are normal active.    Genitourinary: No CVA or flank tenderness.    Musculoskeletal: No reproducible tenderness.  No obvious skin or bony deformities.  Patient has equal range of motion in all extremities and no strength deficiencies.  No muscle or joint tenderness. No back or neck tenderness.  Capillary refill less than 3 seconds.  Strong peripheral pulses.  No sensory deficits.    Neurological: Patient is alert and oriented.  No focal deficits.  5/5 strength in all extremities.  Cranial nerves II through XII intact. GCS15.  NIH 0.    Skin: Skin is normal color for race and is warm, dry, and intact.  No evidence of trauma.  No lesions, rashes, bruising, jaundice, or masses.    Psych: Appropriate mood and affect.  No apparent risk to self or others.    Heme/lymph: No adenopathy, lymphadenopathy, or splenomegaly    Physical exam is otherwise negative unless stated above or in history of present illness.              Patient History   No past medical history on file.  No past surgical history on file.  No family history on file.  Social History     Tobacco Use    Smoking status: Not on file    Smokeless tobacco: Not on file   Substance Use Topics    Alcohol use: Not on file    Drug use: Not on file       Physical Exam   ED Triage Vitals   Temp Pulse Resp  BP   -- -- -- --      SpO2 Temp src Heart Rate Source Patient Position   -- -- -- --      BP Location FiO2 (%)     -- --       Physical Exam      ED Course & MDM   Diagnoses as of 01/14/25 1705   Community acquired pneumonia, unspecified laterality   Hypomagnesemia   Atrial fibrillation, unspecified type (Multi)        Patient updated on plan for lab testing, IV insertion, radiology imaging, and medications to be administered while in the ER (if indicated). Patient updated on expected wait times for testing and results. Patient provided my name and told to ask any staff member for questions or concerns if they should arise. Electronic medical record reviewed.     MDM    Upon initial assessment, patient was healthy non-toxic appearing and in no apparent distress.     Patient presented to the emergency department with the chief complaint shortness of breath, shakiness, tingling in the upper extremities.  Patient has no neurological deficits.  NIH is 0.  Breath sounds are clear and equal bilaterally, 91 to 93% on room air.  No acute respiratory distress.  Patient's heart rhythm was irregular but rate was within normal limits.  On arrival to the emergency department, vital signs were within normal limits    Will obtain basic blood work, EKG and troponin, chest x-ray, BNP, coagulation screen, ethanol level given the patient's history, urinalysis, CTA to rule out PE given the patient's shortness of breath and leg swelling, and a duplex ultrasound to rule out a DVT.    Patient's EKG was performed at 1410 and interpreted by me.  It appears to be most consistent with a flutter 92 beats minute.  Normal axis.  There is no ST elevation or depression.  No prolonged QT.  Patient does not have any documented history of a flutter.    Original and repeat troponin within normal limits making ACS unlikely.  BNP is 338.  Lipase is 34.  Lactate is originally elevated but improved to 1.5 after fluids.  Given the patient's tachycardia,  hypoxia, and elevated lactate blood cultures were obtained.  Ethanol is within normal limits.  CBC reveals a leukocytosis 22 with a left shift neutrophil count.  CBC also reveals a slightly more progressed normocytic anemia.  Chest x-ray reveals findings concerning for pneumonia so she was started on IV antibiotics based on her allergies.  Ultrasound revealed no DVT and CT of the head revealed no obvious signs of infarction or bleeding.    CTA of the chest reveals no PE.  There is emphysematous changes and likely fibrotic changes as well as a tiny right pleural effusion.  Patient also has a known dilation of the thoracic aorta and coronary artery calcifications.  CT abdomen and pelvis shows pancreatitis with a likely pancreatic pseudocyst with potential internal hemorrhage.  This appears to be new as this was not discussed in the patient's most previous CT abdomen and pelvis in late December.  Patient also has cirrhotic morphology of the liver with cavernous transformation of the portal vein and a chronic thrombus of the splenic vein.  Patient also has cholelithiasis without signs of cholecystitis.  She has mild abdominal ascites and cholelithiasis.  She also has an [place] and diverticulosis without diverticulitis.    At this time I do believe the patient would benefit from hospitalization for IV antibiotics for sepsis and new onset A-fib.  I spoke to the hospitalist who is agreeable with this plan.  Patient remained hemodynamically stable while in the emergency department.      This note was dictated using a speech recognition program.  While an attempt was made at proof-reading to minimize errors, minor errors in transcription may be present         No data recorded                                 Medical Decision Making      Procedure  Procedures     Ollie Bernstein PA-C  01/14/25 4599

## 2025-01-15 LAB
ALBUMIN SERPL BCP-MCNC: 2.8 G/DL (ref 3.4–5)
ALP SERPL-CCNC: 33 U/L (ref 33–136)
ALT SERPL W P-5'-P-CCNC: 6 U/L (ref 7–45)
ANION GAP SERPL CALC-SCNC: 15 MMOL/L (ref 10–20)
AST SERPL W P-5'-P-CCNC: 18 U/L (ref 9–39)
BASOPHILS # BLD AUTO: 0.09 X10*3/UL (ref 0–0.1)
BASOPHILS NFR BLD AUTO: 0.4 %
BILIRUB DIRECT SERPL-MCNC: 0.5 MG/DL (ref 0–0.3)
BILIRUB SERPL-MCNC: 1.5 MG/DL (ref 0–1.2)
BUN SERPL-MCNC: 22 MG/DL (ref 6–23)
CALCIUM SERPL-MCNC: 8.1 MG/DL (ref 8.6–10.3)
CHLORIDE SERPL-SCNC: 104 MMOL/L (ref 98–107)
CO2 SERPL-SCNC: 21 MMOL/L (ref 21–32)
CREAT SERPL-MCNC: 0.74 MG/DL (ref 0.5–1.05)
EGFRCR SERPLBLD CKD-EPI 2021: 76 ML/MIN/1.73M*2
EOSINOPHIL # BLD AUTO: 0.03 X10*3/UL (ref 0–0.4)
EOSINOPHIL NFR BLD AUTO: 0.1 %
ERYTHROCYTE [DISTWIDTH] IN BLOOD BY AUTOMATED COUNT: 14 % (ref 11.5–14.5)
GLUCOSE SERPL-MCNC: 87 MG/DL (ref 74–99)
HCT VFR BLD AUTO: 33.4 % (ref 36–46)
HGB BLD-MCNC: 11.2 G/DL (ref 12–16)
HOLD SPECIMEN: NORMAL
HOLD SPECIMEN: NORMAL
IMM GRANULOCYTES # BLD AUTO: 0.19 X10*3/UL (ref 0–0.5)
IMM GRANULOCYTES NFR BLD AUTO: 0.9 % (ref 0–0.9)
LYMPHOCYTES # BLD AUTO: 0.59 X10*3/UL (ref 0.8–3)
LYMPHOCYTES NFR BLD AUTO: 2.7 %
MAGNESIUM SERPL-MCNC: 1.97 MG/DL (ref 1.6–2.4)
MCH RBC QN AUTO: 30.4 PG (ref 26–34)
MCHC RBC AUTO-ENTMCNC: 33.5 G/DL (ref 32–36)
MCV RBC AUTO: 91 FL (ref 80–100)
MONOCYTES # BLD AUTO: 1.05 X10*3/UL (ref 0.05–0.8)
MONOCYTES NFR BLD AUTO: 4.8 %
NEUTROPHILS # BLD AUTO: 20.1 X10*3/UL (ref 1.6–5.5)
NEUTROPHILS NFR BLD AUTO: 91.1 %
NRBC BLD-RTO: 0 /100 WBCS (ref 0–0)
PLATELET # BLD AUTO: 223 X10*3/UL (ref 150–450)
POTASSIUM SERPL-SCNC: 3.9 MMOL/L (ref 3.5–5.3)
PROT SERPL-MCNC: 5.5 G/DL (ref 6.4–8.2)
RBC # BLD AUTO: 3.68 X10*6/UL (ref 4–5.2)
SODIUM SERPL-SCNC: 136 MMOL/L (ref 136–145)
TRIGL SERPL-MCNC: 68 MG/DL (ref 0–149)
WBC # BLD AUTO: 22.1 X10*3/UL (ref 4.4–11.3)

## 2025-01-15 PROCEDURE — 99233 SBSQ HOSP IP/OBS HIGH 50: CPT | Performed by: STUDENT IN AN ORGANIZED HEALTH CARE EDUCATION/TRAINING PROGRAM

## 2025-01-15 PROCEDURE — 2500000001 HC RX 250 WO HCPCS SELF ADMINISTERED DRUGS (ALT 637 FOR MEDICARE OP): Performed by: STUDENT IN AN ORGANIZED HEALTH CARE EDUCATION/TRAINING PROGRAM

## 2025-01-15 PROCEDURE — 83735 ASSAY OF MAGNESIUM: CPT | Performed by: STUDENT IN AN ORGANIZED HEALTH CARE EDUCATION/TRAINING PROGRAM

## 2025-01-15 PROCEDURE — 97165 OT EVAL LOW COMPLEX 30 MIN: CPT | Mod: GO

## 2025-01-15 PROCEDURE — 84478 ASSAY OF TRIGLYCERIDES: CPT | Performed by: NURSE PRACTITIONER

## 2025-01-15 PROCEDURE — 85025 COMPLETE CBC W/AUTO DIFF WBC: CPT | Performed by: STUDENT IN AN ORGANIZED HEALTH CARE EDUCATION/TRAINING PROGRAM

## 2025-01-15 PROCEDURE — 2500000002 HC RX 250 W HCPCS SELF ADMINISTERED DRUGS (ALT 637 FOR MEDICARE OP, ALT 636 FOR OP/ED): Performed by: STUDENT IN AN ORGANIZED HEALTH CARE EDUCATION/TRAINING PROGRAM

## 2025-01-15 PROCEDURE — 2500000004 HC RX 250 GENERAL PHARMACY W/ HCPCS (ALT 636 FOR OP/ED): Performed by: NURSE PRACTITIONER

## 2025-01-15 PROCEDURE — 2500000001 HC RX 250 WO HCPCS SELF ADMINISTERED DRUGS (ALT 637 FOR MEDICARE OP): Performed by: NURSE PRACTITIONER

## 2025-01-15 PROCEDURE — 2500000004 HC RX 250 GENERAL PHARMACY W/ HCPCS (ALT 636 FOR OP/ED): Performed by: STUDENT IN AN ORGANIZED HEALTH CARE EDUCATION/TRAINING PROGRAM

## 2025-01-15 PROCEDURE — 80053 COMPREHEN METABOLIC PANEL: CPT | Performed by: STUDENT IN AN ORGANIZED HEALTH CARE EDUCATION/TRAINING PROGRAM

## 2025-01-15 PROCEDURE — 1210000001 HC SEMI-PRIVATE ROOM DAILY

## 2025-01-15 PROCEDURE — 99222 1ST HOSP IP/OBS MODERATE 55: CPT | Performed by: NURSE PRACTITIONER

## 2025-01-15 PROCEDURE — 97161 PT EVAL LOW COMPLEX 20 MIN: CPT | Mod: GP

## 2025-01-15 PROCEDURE — 36415 COLL VENOUS BLD VENIPUNCTURE: CPT | Performed by: STUDENT IN AN ORGANIZED HEALTH CARE EDUCATION/TRAINING PROGRAM

## 2025-01-15 PROCEDURE — 82248 BILIRUBIN DIRECT: CPT | Performed by: STUDENT IN AN ORGANIZED HEALTH CARE EDUCATION/TRAINING PROGRAM

## 2025-01-15 RX ORDER — METOPROLOL SUCCINATE 25 MG/1
37.5 TABLET, EXTENDED RELEASE ORAL DAILY
Status: DISCONTINUED | OUTPATIENT
Start: 2025-01-15 | End: 2025-01-17 | Stop reason: HOSPADM

## 2025-01-15 RX ORDER — PANTOPRAZOLE SODIUM 40 MG/1
40 TABLET, DELAYED RELEASE ORAL
Status: DISCONTINUED | OUTPATIENT
Start: 2025-01-15 | End: 2025-01-15

## 2025-01-15 RX ORDER — CALCIUM CARBONATE 200(500)MG
500 TABLET,CHEWABLE ORAL 4 TIMES DAILY PRN
Status: DISCONTINUED | OUTPATIENT
Start: 2025-01-15 | End: 2025-01-17 | Stop reason: HOSPADM

## 2025-01-15 RX ORDER — SODIUM CHLORIDE, SODIUM LACTATE, POTASSIUM CHLORIDE, CALCIUM CHLORIDE 600; 310; 30; 20 MG/100ML; MG/100ML; MG/100ML; MG/100ML
100 INJECTION, SOLUTION INTRAVENOUS CONTINUOUS
Status: ACTIVE | OUTPATIENT
Start: 2025-01-15 | End: 2025-01-16

## 2025-01-15 RX ORDER — MIRTAZAPINE 15 MG/1
15 TABLET, FILM COATED ORAL NIGHTLY
Status: DISCONTINUED | OUTPATIENT
Start: 2025-01-15 | End: 2025-01-17 | Stop reason: HOSPADM

## 2025-01-15 RX ORDER — PANTOPRAZOLE SODIUM 40 MG/1
40 TABLET, DELAYED RELEASE ORAL 2 TIMES DAILY
Status: DISCONTINUED | OUTPATIENT
Start: 2025-01-15 | End: 2025-01-17 | Stop reason: HOSPADM

## 2025-01-15 RX ORDER — ROSUVASTATIN CALCIUM 10 MG/1
10 TABLET, COATED ORAL NIGHTLY
Status: DISCONTINUED | OUTPATIENT
Start: 2025-01-15 | End: 2025-01-17 | Stop reason: HOSPADM

## 2025-01-15 RX ORDER — PANTOPRAZOLE SODIUM 40 MG/10ML
40 INJECTION, POWDER, LYOPHILIZED, FOR SOLUTION INTRAVENOUS 2 TIMES DAILY
Status: DISCONTINUED | OUTPATIENT
Start: 2025-01-15 | End: 2025-01-17 | Stop reason: HOSPADM

## 2025-01-15 RX ADMIN — CEFEPIME 1 G: 1 INJECTION, POWDER, FOR SOLUTION INTRAMUSCULAR; INTRAVENOUS at 17:05

## 2025-01-15 RX ADMIN — PANTOPRAZOLE SODIUM 40 MG: 40 TABLET, DELAYED RELEASE ORAL at 20:40

## 2025-01-15 RX ADMIN — PANTOPRAZOLE SODIUM 40 MG: 40 TABLET, DELAYED RELEASE ORAL at 10:13

## 2025-01-15 RX ADMIN — METRONIDAZOLE 500 MG: 500 INJECTION, SOLUTION INTRAVENOUS at 04:33

## 2025-01-15 RX ADMIN — ROSUVASTATIN CALCIUM 10 MG: 10 TABLET, FILM COATED ORAL at 20:36

## 2025-01-15 RX ADMIN — NYSTATIN 1 APPLICATION: 100000 POWDER TOPICAL at 20:37

## 2025-01-15 RX ADMIN — METRONIDAZOLE 500 MG: 500 INJECTION, SOLUTION INTRAVENOUS at 20:36

## 2025-01-15 RX ADMIN — METOPROLOL SUCCINATE 37.5 MG: 25 TABLET, EXTENDED RELEASE ORAL at 09:41

## 2025-01-15 RX ADMIN — ANTACID TABLETS 500 MG: 500 TABLET, CHEWABLE ORAL at 20:40

## 2025-01-15 RX ADMIN — SODIUM CHLORIDE, SODIUM LACTATE, POTASSIUM CHLORIDE, AND CALCIUM CHLORIDE 100 ML/HR: 600; 310; 30; 20 INJECTION, SOLUTION INTRAVENOUS at 19:36

## 2025-01-15 RX ADMIN — NYSTATIN 1 APPLICATION: 100000 POWDER TOPICAL at 10:11

## 2025-01-15 RX ADMIN — SODIUM CHLORIDE, POTASSIUM CHLORIDE, SODIUM LACTATE AND CALCIUM CHLORIDE 100 ML: 600; 310; 30; 20 INJECTION, SOLUTION INTRAVENOUS at 17:03

## 2025-01-15 RX ADMIN — CEFEPIME 1 G: 1 INJECTION, POWDER, FOR SOLUTION INTRAMUSCULAR; INTRAVENOUS at 05:39

## 2025-01-15 RX ADMIN — MIRTAZAPINE 15 MG: 15 TABLET, FILM COATED ORAL at 20:36

## 2025-01-15 RX ADMIN — METRONIDAZOLE 500 MG: 500 INJECTION, SOLUTION INTRAVENOUS at 13:09

## 2025-01-15 ASSESSMENT — COGNITIVE AND FUNCTIONAL STATUS - GENERAL
MOBILITY SCORE: 18
TURNING FROM BACK TO SIDE WHILE IN FLAT BAD: A LITTLE
TOILETING: A LOT
MOVING TO AND FROM BED TO CHAIR: A LITTLE
MOVING FROM LYING ON BACK TO SITTING ON SIDE OF FLAT BED WITH BEDRAILS: A LITTLE
HELP NEEDED FOR BATHING: A LITTLE
MOVING TO AND FROM BED TO CHAIR: A LITTLE
MOVING TO AND FROM BED TO CHAIR: A LITTLE
TURNING FROM BACK TO SIDE WHILE IN FLAT BAD: A LITTLE
DRESSING REGULAR LOWER BODY CLOTHING: A LITTLE
DAILY ACTIVITIY SCORE: 20
DRESSING REGULAR LOWER BODY CLOTHING: A LITTLE
CLIMB 3 TO 5 STEPS WITH RAILING: A LOT
TOILETING: A LITTLE
PERSONAL GROOMING: A LITTLE
DAILY ACTIVITIY SCORE: 19
DAILY ACTIVITIY SCORE: 20
WALKING IN HOSPITAL ROOM: A LITTLE
STANDING UP FROM CHAIR USING ARMS: A LOT
STANDING UP FROM CHAIR USING ARMS: A LITTLE
MOVING FROM LYING ON BACK TO SITTING ON SIDE OF FLAT BED WITH BEDRAILS: A LITTLE
MOBILITY SCORE: 16
TOILETING: A LOT
HELP NEEDED FOR BATHING: A LITTLE
TOILETING: A LITTLE
WALKING IN HOSPITAL ROOM: A LITTLE
MOVING TO AND FROM BED TO CHAIR: A LOT
DRESSING REGULAR UPPER BODY CLOTHING: A LITTLE
TURNING FROM BACK TO SIDE WHILE IN FLAT BAD: A LITTLE
MOVING FROM LYING ON BACK TO SITTING ON SIDE OF FLAT BED WITH BEDRAILS: A LITTLE
HELP NEEDED FOR BATHING: A LITTLE
DRESSING REGULAR LOWER BODY CLOTHING: A LITTLE
WALKING IN HOSPITAL ROOM: A LOT
CLIMB 3 TO 5 STEPS WITH RAILING: A LITTLE
MOBILITY SCORE: 18
DAILY ACTIVITIY SCORE: 19
TURNING FROM BACK TO SIDE WHILE IN FLAT BAD: A LITTLE
DRESSING REGULAR UPPER BODY CLOTHING: A LITTLE
DRESSING REGULAR LOWER BODY CLOTHING: A LITTLE
CLIMB 3 TO 5 STEPS WITH RAILING: TOTAL
DRESSING REGULAR UPPER BODY CLOTHING: A LITTLE
WALKING IN HOSPITAL ROOM: A LITTLE
CLIMB 3 TO 5 STEPS WITH RAILING: A LITTLE
STANDING UP FROM CHAIR USING ARMS: A LITTLE
STANDING UP FROM CHAIR USING ARMS: A LITTLE
HELP NEEDED FOR BATHING: A LITTLE
MOVING FROM LYING ON BACK TO SITTING ON SIDE OF FLAT BED WITH BEDRAILS: A LITTLE
MOBILITY SCORE: 14

## 2025-01-15 ASSESSMENT — PAIN SCALES - GENERAL
PAINLEVEL_OUTOF10: 0 - NO PAIN

## 2025-01-15 ASSESSMENT — PAIN SCALES - WONG BAKER: WONGBAKER_NUMERICALRESPONSE: NO HURT

## 2025-01-15 ASSESSMENT — PAIN - FUNCTIONAL ASSESSMENT
PAIN_FUNCTIONAL_ASSESSMENT: 0-10
PAIN_FUNCTIONAL_ASSESSMENT: 0-10

## 2025-01-15 ASSESSMENT — ACTIVITIES OF DAILY LIVING (ADL): BATHING_ASSISTANCE: STAND BY

## 2025-01-15 NOTE — PROGRESS NOTES
Physical Therapy    Physical Therapy Evaluation    Patient Name: Mary Burden  MRN: [id number]  Today's Date: 1/15/2025   Time Calculation  Start Time: 1014  Stop Time: 1032  Time Calculation (min): 18 min  1012/1012-A    Assessment/Plan   PT Assessment  PT Assessment Results: Decreased strength, Decreased endurance, Impaired balance, Decreased mobility, Decreased coordination  Rehab Prognosis: Good  Evaluation/Treatment Tolerance: Patient limited by fatigue  Barriers to Participation: Comorbidities  End of Session Communication: Bedside nurse  Assessment Comment: pt with decreased mobility /gait strength balance endurance pt to benefit from skilled PT to address deficits and improve functional mobility  End of Session Patient Position: Up in chair, Alarm on  IP OR SWING BED PT PLAN  Inpatient or Swing Bed: Inpatient  PT Plan  Treatment/Interventions: Bed mobility, Transfer training, Gait training, Stair training, Balance training, Strengthening, Endurance training, Therapeutic exercise, Therapeutic activity  PT Plan: Ongoing PT  PT Frequency: 3 times per week  PT Discharge Recommendations: Low intensity level of continued care  PT Recommended Transfer Status: Assist x1, Assistive device  PT - OK to Discharge: Yes (once medically ready for discharge to next level of care.)    Subjective     Current Problem:  1. Community acquired pneumonia, unspecified laterality        2. Hypomagnesemia        3. Atrial fibrillation, unspecified type (Multi)          Patient Active Problem List   Diagnosis    Community acquired pneumonia, unspecified laterality     General Visit Information:  General  Reason for Referral: weakness/ impaired mobility  Referred By: OT/PT Serafin 1/14  Past Medical History Relevant to Rehab: HLD,HTN,DM2, GERD, Pulmonary fiborsis, alcohol use disorder.  Co-Treatment: OT  Co-Treatment Reason: to maximize pt safety  Prior to Session Communication: Bedside nurse (cleared to see for therapy evmary ann)  Patient  Position Received: Bed, 3 rail up, Alarm on  General Comment: pt is a 94 yo female  [facility] on 1/14 with reports of SOB and weakness.  dcx : community aquired PNA and acute pancreatitis  test/ labs: BNP : 338, CXR :  Right lower lobe infiltrates, small R pleural effusion.  US BLEs neg DVT,  CT angio chest/ CT ABD :  neg PE ,  pulmonary fibrosis,  acute pancreatitis , cirrhosis of the liver, moderate ascites, CT head neg .  Home Living:  Home Living  Home Living Comments: pt lives alone in a 1 level home.  3 steps with HR to enter.  pt has a tub/shower with seat.  laundry on main floor.  Prior Level of Function:  Prior Function Per Pt/Caregiver Report  Prior Function Comments: per pt IND with mobility and gait has a QC she takes out in community .  also has a walker available.  mod I with adls and assistance with iadls.  no falls still drives  Precautions:  Precautions  Medical Precautions: Fall precautions  Vital Signs:  Objective   Pain:  Pain Assessment  Pain Assessment: 0-10  0-10 (Numeric) Pain Score: 0 - No pain  Cognition:  Cognition  Overall Cognitive Status: Within Functional Limits  General Assessments:  Activity Tolerance  Endurance: Tolerates less than 10 min exercise, no significant change in vital signs  Sensation  Sensation Comment: intact BLEs  Strength  Strength Comments: BLE 4+/5     Coordination  Movements are Fluid and Coordinated: Yes     Static Sitting Balance  Static Sitting-Comment/Number of Minutes: good  Dynamic Sitting Balance  Dynamic Sitting-Comments: good  Static Standing Balance  Static Standing-Comment/Number of Minutes: fair  Dynamic Standing Balance  Dynamic Standing-Comments: fair  Functional Assessments:  Bed Mobility  Bed Mobility: Yes  Bed Mobility 1  Bed Mobility 1: Supine to sitting, Scooting  Level of Assistance 1: Contact guard  Bed Mobility Comments 1: cga to EOB . cues to scoot out put feet on floor.  Transfers  Transfer: Yes  Transfer 1  Technique 1: Sit to  stand, Stand to sit  Transfer Device 1: Walker (FWW)  Transfer Level of Assistance 1: Contact guard  Trials/Comments 1: cga with FWW cues to push up and reach back with transfers .  Ambulation/Gait Training  Ambulation/Gait Training Performed: Yes  Ambulation/Gait Training 1  Surface 1: Level tile  Device 1: Rolling walker  Assistance 1: Minimum assistance, Minimal verbal cues  Quality of Gait 1: Forward flexed posture, Inconsistent stride length, Decreased step length  Comments/Distance (ft) 1: 50ft with FWW MIN A cues to stay in TIM of walker.  Extremity/Trunk Assessments:  RLE   RLE : Within Functional Limits  LLE   LLE : Within Functional Limits  Outcome Measures:     [facility] Basic Mobility  Turning from your back to your side while in a flat bed without using bedrails: A little  Moving from lying on your back to sitting on the side of a flat bed without using bedrails: A little  Moving to and from bed to chair (including a wheelchair): A little  Standing up from a chair using your arms (e.g. wheelchair or bedside chair): A little  To walk in hospital room: A little  Climbing 3-5 steps with railing: Total  Basic Mobility - Total Score: 16  Goals:  Encounter Problems       Encounter Problems (Active)       PT Problem       Pt will demonstrate mod I  with bed mobility to edge of bed.         Start:  01/15/25    Expected End:  01/29/25            Pt will demonstrate mod I  with sit to stand/chair transfers with FWW.         Start:  01/15/25    Expected End:  01/29/25            Pt will ambulate 100 feet with FWW MOD I .         Start:  01/15/25    Expected End:  01/29/25            Pt to demo 3 steps with  rails with SUP        Start:  01/15/25    Expected End:  01/29/25               Pain - Adult            Education Documentation  Mobility Training, taught by Ahmet Mayer, PT at 1/15/2025 11:54 AM.  Learner: Patient  Readiness: Acceptance  Method: Explanation  Response: Verbalizes Understanding    Education  Comments  No comments found.

## 2025-01-15 NOTE — NURSING NOTE
At 1050, Martha Newton CNP was in to assess patient and to discuss plan of care. Daughter was present at time of visit.

## 2025-01-15 NOTE — CONSULTS
"Nutrition Initial Assessment:   Nutrition Assessment    Reason for Assessment: Admission nursing screening    Patient is a 93 y.o. female presenting with community acquired pneumonia  past medical history of GERD, previous alcohol use disorder, hypertension, hyperlipidemia, recent admission at [facility] for acute pancreatitis presented from home with chief complaints of shortness of breath, weakness, on exam patient endorses that her shortness of breath is improved with oxygen.       Nutrition History:  Energy Intake:  (no meal intakes recorded at this time)  Food and Nutrient History: RDN consult for MST score of 2. Met with pt, daughter at bedside. Pt denies recent wt changes but states unsure of UBW range. Daughter reports wt loss over the past year, states she lost alot when she had COVID, states UBW prior to wt loss was ~120-130 lbs. Pt states she has not had an appetite for a while, reports she was drinking 3 Boost per day but since admission in the end of December has not been eating much or drinking much Boost. Pt denies GI symptoms at this time. Reports missing teeth, has difficulty chewing hard testures and reports some difficulty swallowing pills. Pt agreeable to trial of Gelatine and Ensure Clear. Will continue to monitor.  Vitamin/Herbal Supplement Use: none per home med list  Food Allergies/Intolerances:  None  GI Symptoms: None  Oral Problems: Chewing difficulty       Anthropometrics:  Height: 160 cm (5' 3\")   Weight: 45.4 kg (100 lb)   BMI (Calculated): 17.72  IBW/kg (Dietitian Calculated): 52.3 kg  Percent of IBW: 87 %       Weight History:   Wt Readings from Last 10 Encounters:   01/14/25 45.4 kg (100 lb)   09/05/24 48.5 kg (107 lbs)      Weight Change %:  Weight History / % Weight Change: per chart review, pt with 7 lb, 6.5% nonsignificant wt loss in 4 months  Significant Weight Loss: No    Nutrition Focused Physical Exam Findings:    Subcutaneous Fat Loss:   Orbital Fat Pads: Mild-Moderate (slight " "dark circles and slight hollowing)  Buccal Fat Pads: Severe (hollow, sunken and narrow face)  Muscle Wasting:  Temporalis: Severe (hollowed scooping depression)  Pectoralis (Clavicular Region): Severe (protruding prominent clavicle)  Deltoid/Trapezius: Severe (squared shoulders, acromion process prominent)  Edema:  Edema: +1 trace  Edema Location: BLE  Physical Findings:  Skin: Negative    Nutrition Significant Labs:  BMP Trend:   Results from last 7 days   Lab Units 01/15/25  0444 01/14/25  1425   GLUCOSE mg/dL 87 87   CALCIUM mg/dL 8.1* 8.1*   SODIUM mmol/L 136 138   POTASSIUM mmol/L 3.9 4.0   CO2 mmol/L 21 23   CHLORIDE mmol/L 104 105   BUN mg/dL 22 22   CREATININE mg/dL 0.74 0.78    , A1C:No results found for: \"HGBA1C\", BG POCT trend:        Nutrition Specific Medications:  Scheduled medications  pantoprazole, 40 mg, oral, Daily before breakfast  rosuvastatin, 10 mg, oral, Nightly    I/O:    ;      Dietary Orders (From admission, onward)       Start     Ordered    01/15/25 1115  Adult diet Clear Liquid  Diet effective now        Question:  Diet type  Answer:  Clear Liquid    01/15/25 1114    01/14/25 1957  May Participate in Room Service  ( ROOM SERVICE MAY PARTICIPATE)  Once        Question:  .  Answer:  Yes    01/14/25 1956                     Estimated Needs:   Total Energy Estimated Needs (kCal): 1360 kCal  Method for Estimating Needs: 4881-4772 (30-35 kcal/kg ABW)  Total Protein Estimated Needs (g): 68 g  Method for Estimating Needs: 68-91 g (1.5-2 g/kg ABW)  Total Fluid Estimated Needs (mL): 1590 mL  Method for Estimating Needs: 1 ml/kcal or per MD        Nutrition Diagnosis   Malnutrition Diagnosis  Patient has Malnutrition Diagnosis: Yes  Diagnosis Status: New  Malnutrition Diagnosis: Severe malnutrition related to acute disease or injury  As Evidenced by: <50% of estimated energy requirements for > 5 day, moderate to severe fat loss, severe muscle losses            Nutrition " Interventions/Recommendations         Nutrition Prescription:  Individualized Nutrition Prescription Provided for : Advance to Regular diet as medically appropriate. ONS with meals        Nutrition Interventions:   Interventions: Meals and snacks, Medical food supplement  Meals and Snacks: General healthful diet  Goal: Consumes 3 meals per day  Medical Food Supplement: Commercial beverage  Goal: Ensure Clear BID (provides 240 kcal, 8 g protein per serving).  Gelatein Plus with lunch (160 kcal and 20 g protein per serving)    Collaboration and Referral of Nutrition Care: Collaboration by nutrition professional with other providers  Goal: secure chat attending    Nutrition Education:   No needs at this time       Nutrition Monitoring and Evaluation   Food/Nutrient Related History Monitoring  Monitoring and Evaluation Plan: Energy intake, Amount of food, Fluid intake  Energy Intake: Estimated energy intake  Criteria: Pt meets >75% of estimated energy needs  Fluid Intake: Estimated fluid intake  Criteria: Meets >75% of estimated fluid needs  Amount of Food: Estimated amout of food, Medical food intake  Criteria: Pt consumes >75% of meals and supplements    Body Composition/Growth/Weight History  Monitoring and Evaluation Plan: Weight  Weight: Measured weight  Criteria: Maintains stable weight    Biochemical Data, Medical Tests and Procedures  Monitoring and Evaluation Plan: Glucose/endocrine profile, Electrolyte/renal panel  Electrolyte and Renal Panel: Sodium, Potassium, Phosphorus  Criteria: Electrolytes WNL  Glucose/Endocrine Profile: Glucose, casual  Criteria: BG within desirable range              Time Spent (min): 40 minutes

## 2025-01-15 NOTE — CONSULTS
Reason For Consult  Evaluation of abnormal CT scan of the chest, pulmonary fibrosis and other pulmonary related issues  Chief Complaint: SOB      History Of Present Illness:       Mary Burden is a 93 y.o. female with a significant past medical history of GERD, previous alcohol use disorder, hypertension, hyperlipidemia, recent admission at [facility] for acute pancreatitis presented from home with chief complaints of shortness of breath, weakness, on exam patient endorses that her shortness of breath is improved with oxygen.  States having some abdominal pain which has improved.  Denies having any alcohol use since her discharge from [facility] on 1/1.           Recent hospitalization, [facility] 12/28 to 1/1:     Admitted for acute pancreatitis likely in setting of alcohol use.  Also found to have cholelithiasis.  Seen by GI/general surgery who recommended outpatient follow-up and further intervention.  CT abdomen/pelvis at that time showed acute pancreatitis otherwise unremarkable.      I was asked to evaluate and follow from a pulmonary perspective especially in regards to abnormal CT scan of the chest suggestive of pulmonary fibrosis.  Has been intermittently admitted for acute pancreatitis and is on antibiotics of cefepime and Flagyl for suspected pseudocyst of the pancreas.  Patient is noted to have prior episodes of acute pancreatitis end of last year and was felt to have alcohol related pancreatitis.  She does drink several cans of beer a day.  She apparently drank from teenage years until late 2024.  She also has a history of smoking at the rate of few cigarettes a day until 30 years ago from teenage years.  She has had significant secondhand smoke exposure from .  She has no history of asthma or COPD.  Denies prior diagnosis of pulmonary fibrosis.  History of DVT pulmonary embolism.  On this admission she some shortness of breath which improved with oxygen denies significant cough, sputum production,  wheezing or hemoptysis.  Her last CT scan of the chest was reviewed with the radiologist Dr. Jones.  It is of poor quality with a lot of motion artifact however there are some suggestion of pulmonary fibrosis.  There is no consolidating lung infiltrates, pleural effusions or any lung masses.     ED course:     - Vitals: Temperature 97.3, , /87, RR 18 saturating 91% on 2 L nasal cannula     - Labs: WBC count of 22 otherwise unremarkable CBC.  Bilirubin at 1.5 magnesium 1.4 otherwise unremarkable CMP.  Lactate of 2.7 which improved to 1.5.  Lipase of 34.     - Imaging: CT abdomen/pelvis with IV contrast with acute pancreatitis, also with a large 5.2 x 4.4 x 9.9 cm hemorrhagic pseudocyst with other smaller pseudocyst seen in the pancreatic head.  CTA chest negative for PE.  Interstitial markings and chronic fibrosis seen.  Thoracic aorta dilatation also seen.                Review of systems: 10 point review of systems is otherwise negative except as mentioned above.           PAST HISTORIES:           Past Medical History:     She has a past medical history of Hypertension and Pancreatitis ([facility]).           Past Surgical History:     She has no past surgical history on file.            Social History:     She reports that she has never smoked. She has never been exposed to tobacco smoke. She has never used smokeless tobacco. She reports that she does not currently use alcohol. She reports that she does not use drugs.           Family History:     Family History     No family history on file.                 Allergies:     Amoxicillin and Codeine          SUBJECTIVE     Patient was seen and examined bedside this morning. States having slight abdominal pain otherwise unremarkable.     OBJECTIVE:     Last Recorded Vitals: Vitals Vitals: 01/14/25 2343 01/15/25 0354 01/15/25 0734 01/15/25 1603 BP: 107/59 110/59 124/59 112/62 BP Location: Right arm Right arm Right arm Patient Position: Lying Lying Lying  Pulse: 87 85 85 91 Resp: 16 18 18 18 Temp: 36.6 °C (97.9 °F) 36 °C (96.8 °F) 36.4 °C (97.5 °F) 37 °C (98.6 °F) TempSrc: Temporal Temporal Temporal SpO2: 99% 99% 99% 98% Weight:     Height:      Last I/O: I/O last 3 completed shifts: In: 750 (16.5 mL/kg) [I.V.:50 (1.1 mL/kg); IV Piggyback:700] Out: 400 (8.8 mL/kg) [Urine:400 (0.2 mL/kg/hr)] Weight: 45.4 kg      Physical Exam Vitals reviewed. Constitutional:      General: She is not in acute distress. Appearance: Normal appearance. She is not toxic-appearing. HENT: Head: Normocephalic and atraumatic. Eyes: Extraocular Movements: Extraocular movements intact. Conjunctiva/sclera: Conjunctivae normal. Cardiovascular: Rate and Rhythm: Normal rate and regular rhythm. Pulses: Normal pulses. Heart sounds: Normal heart sounds. Pulmonary: Effort: Pulmonary effort is normal. No respiratory distress. Breath sounds: Normal breath sounds. No wheezing. Abdominal: General: Bowel sounds are normal. There is no distension. Palpations: Abdomen is soft. Tenderness: There is abdominal tenderness. There is no guarding. Musculoskeletal:      General: Normal range of motion. Cervical back: Normal range of motion and neck supple. Neurological: General: No focal deficit present. Mental Status: She is alert and oriented to person, place, and time. Mental status is at baseline. Psychiatric:      Mood and Affect: Mood normal.      Behavior: Behavior normal.      Thought Content: Thought content normal.      Inpatient Medications:     Scheduled Medications cefepime, 1 g, intravenous, q12h metoprolol succinate XL, 37.5 mg, oral, Daily metroNIDAZOLE, 500 mg, intravenous, q8h mirtazapine, 15 mg, oral, Nightly nystatin, 1 Application, Topical, BID pantoprazole, 40 mg, oral, Daily before breakfast rosuvastatin, 10 mg, oral, Nightly     PRN Medications PRN Medications PRN medications: acetaminophen OR acetaminophen OR acetaminophen, melatonin, polyethylene glycol     Continuous Medications: Continuous  Medications     LABS AND IMAGING:     Labs: Results from last 7 days Lab Units 01/15/25 0444 01/14/25 1425 WBC AUTO x103/uL 22.1 22.4* RBC AUTO x106/uL 3.68 3.83* HEMOGLOBIN g/dL 11.2* 11.5* HEMATOCRIT % 33.4* 35.0* MCV fL 91 91 MCH pg 30.4 30.0 MCHC g/dL 33.5 32.9 RDW % 14.0 13.9 PLATELETS AUTO x10*3/uL 223 300     Results from last 7 days Lab Units 01/15/25 0444 01/14/25 1425 SODIUM mmol/L 136 138 POTASSIUM mmol/L 3.9 4.0 CHLORIDE mmol/L 104 105 CO2 mmol/L 21 23 BUN mg/dL 22 22 CREATININE mg/dL 0.74 0.78 GLUCOSE mg/dL 87 87 PROTEIN TOTAL g/dL 5.5* 5.6* CALCIUM mg/dL 8.1* 8.1* BILIRUBIN TOTAL mg/dL 1.5* 1.5* ALK PHOS U/L 33 35 AST U/L 18 17 ALT U/L 6* 8     Results from last 7 days Lab Units 01/15/25 0444 01/14/25 1425 MAGNESIUM mg/dL 1.97 1.41*     Results from last 7 days Lab Units 01/14/25 1620 01/14/25 1425 TROPHS ng/L 12 10     Imaging: CT abdomen pelvis w IV contrast Narrative: STUDY: CT Angiogram of the Chest, CT Abdomen and Pelvis with IV Contrast; 01/14/2025 at 4:12 PM INDICATION: Shortness of breath. Leg swelling. Lower abdominal pain. COMPARISON: XR chest 01/14/25. ACCESSION NUMBER(S): [id number], [id number] ORDERING CLINICIAN: MARK VOGEL TECHNIQUE: CTA of the chest was performed following rapid injection of intravenous contrast. Images are reviewed and processed at a workstation according to the CT angiogram protocol with 3-D and/or MIP post processing imaging generated. CT of the abdomen and pelvis was performed with intravenous contrast. Omnipaque-350 75 mL was administered intravenously; positive oral contrast was given. Automated mA/kV exposure control was utilized and patient examination was performed in strict accordance with principles of ALARA. FINDINGS:     No filling defects are seen within the main pulmonary artery, left or right pulmonary arteries, or first order branches to indicate an embolus. No early filling veins are visualized indicate an arteriovenous malformation.  Emphysematous changes are seen within the lungs bilaterally. There are peripheral interstitial markings bilaterally, which may indicate a chronic process such as fibrosis. There is a tiny right pleural effusion. No enlarged lymph nodes are seen in the mediastinal or hilar regions. There is no pericardial effusion. Coronary artery calcifications are present. There is aneurysmal dilatation of the ascending aorta at 4.3 x 4.0 cm. The aortic arch is measured at 3.4 cm. The proximal descending thoracic aorta is measured at 3.2 cm. The distal descending thoracic aorta is measured at 2.6 cm. Degenerative changes are seen throughout the thoracic spine.     There is a nodular contour of the liver, indicative of cirrhosis. There appears be partial cavernous transformation of the portal vein. The hepatic veins appear patent. Gallstones are present. There is moderate distention of the gallbladder. The spleen is not enlarged. There appears to be chronic thrombosis of the splenic vein. There are inflammatory changes surrounding the pancreas, indicative of acute hepatitis. There are multiple fluid collections are visualized, indicative of cirrhosis. There appears be a hemorrhagic pseudocyst posterior to the head measured at 5.2 x 4.4 x 9.9 cm(Series 602, Image 58; Series 601, Image 53). The hemorrhagic component is seen in the superior aspect of the pseudocyst is estimated 1.8 x 2.0 cm. A smaller pseudocyst is seen anterior to the pancreatic head measured at 1.5 x 1.6 cm(Series 601, Image 49). No adrenal nodule is noted. No acute findings are seen within either kidney. There is a mild to moderate amount of abdominal ascites. No dilated loops of small bowel or colon are visually. There are diverticula seen throughout the colon. There is no evidence of diverticulitis. The appendix is unremarkable in appearance. No enlarged lymph nodes are seen in the pelvic sidewalls, iliac chains, or retroperitoneum. There is aneurysmal dilatation  of the abdominal aorta at 3.2 cm. No prominent edema is seen within the psoas musculature. There is generalized anasarca. Degenerative changes are seen throughout the lumbar spine. Impression: CHEST:     No evidence of a pulmonary embolus.     Emphysematous changes.     Peripheral interstitial markings, most likely a chronic process such as fibrosis.     Tiny right pleural effusion.     Coronary artery calcifications.     Aneurysmal dilatation of the thoracic aorta. ABDOMEN/PELVIS:     Acute pancreatitis, with development of pseudocysts off of the pancreatic head. The larger pseudocyst displays a small focus of internal hemorrhage. Correlation with serum CBC is recommended.     Cirrhotic morphology of the liver, with cavernous transformation of the portal vein.     Chronic thrombosis of the splenic vein.     Cholelithiasis.     Mild to moderate abdominal ascites.     Anasarca.     Aneurysmal dilatation of the abdominal aorta.     Colonic diverticulosis, no evidence of diverticulitis. This report was called to Dr. Mark Vogel at 4:55 PM on January 14, 2025. Signed by Jonathan Farias MD CT angio chest for pulmonary embolism Narrative: STUDY: CT Angiogram of the Chest, CT Abdomen and Pelvis with IV Contrast; 01/14/2025 at 4:12 PM INDICATION: Shortness of breath. Leg swelling. Lower abdominal pain. COMPARISON: XR chest 01/14/25. ACCESSION NUMBER(S): [id number], [id number] ORDERING CLINICIAN: MARK VOGEL TECHNIQUE: CTA of the chest was performed following rapid injection of intravenous contrast. Images are reviewed and processed at a workstation according to the CT angiogram protocol with 3-D and/or MIP post processing imaging generated. CT of the abdomen and pelvis was performed with intravenous contrast. Omnipaque-350 75 mL was administered intravenously; positive oral contrast was given. Automated mA/kV exposure control was utilized and patient examination was performed in strict accordance with principles of  CECILIA. FINDINGS:     No filling defects are seen within the main pulmonary artery, left or right pulmonary arteries, or first order branches to indicate an embolus. No early filling veins are visualized indicate an arteriovenous malformation. Emphysematous changes are seen within the lungs bilaterally. There are peripheral interstitial markings bilaterally, which may indicate a chronic process such as fibrosis. There is a tiny right pleural effusion. No enlarged lymph nodes are seen in the mediastinal or hilar regions. There is no pericardial effusion. Coronary artery calcifications are present. There is aneurysmal dilatation of the ascending aorta at 4.3 x 4.0 cm. The aortic arch is measured at 3.4 cm. The proximal descending thoracic aorta is measured at 3.2 cm. The distal descending thoracic aorta is measured at 2.6 cm. Degenerative changes are seen throughout the thoracic spine.     There is a nodular contour of the liver, indicative of cirrhosis. There appears be partial cavernous transformation of the portal vein. The hepatic veins appear patent. Gallstones are present. There is moderate distention of the gallbladder. The spleen is not enlarged. There appears to be chronic thrombosis of the splenic vein. There are inflammatory changes surrounding the pancreas, indicative of acute hepatitis. There are multiple fluid collections are visualized, indicative of cirrhosis. There appears be a hemorrhagic pseudocyst posterior to the head measured at 5.2 x 4.4 x 9.9 cm(Series 602, Image 58; Series 601, Image 53). The hemorrhagic component is seen in the superior aspect of the pseudocyst is estimated 1.8 x 2.0 cm. A smaller pseudocyst is seen anterior to the pancreatic head measured at     1.5 x 1.6 cm(Series 601, Image 49). No adrenal nodule is noted. No acute findings are seen within either kidney. There is a mild to moderate amount of abdominal ascites. No dilated loops of small bowel or colon are visually. There are  diverticula seen throughout the colon. There is no evidence of diverticulitis. The appendix is unremarkable in appearance. No enlarged lymph nodes are seen in the pelvic sidewalls, iliac chains, or retroperitoneum. There is aneurysmal dilatation of the abdominal aorta at 3.2 cm. No prominent edema is seen within the psoas musculature. There is generalized anasarca. Degenerative changes are seen throughout the lumbar spine. Impression: CHEST:     No evidence of a pulmonary embolus.     Emphysematous changes.     Peripheral interstitial markings, most likely a chronic process such as fibrosis.     Tiny right pleural effusion.     Coronary artery calcifications.     Aneurysmal dilatation of the thoracic aorta. ABDOMEN/PELVIS:     Acute pancreatitis, with development of pseudocysts off of the pancreatic head. The larger pseudocyst displays a small focus of internal hemorrhage. Correlation with serum CBC is recommended.     Cirrhotic morphology of the liver, with cavernous transformation of the portal vein.     Chronic thrombosis of the splenic vein.     Cholelithiasis.     Mild to moderate abdominal ascites.     Anasarca.     Aneurysmal dilatation of the abdominal aorta.     Colonic diverticulosis, no evidence of diverticulitis. This report was called to Dr. Mark Vogel at 4:55 PM on January 14, 2025. Signed by Jonathan Farias MD CT head wo IV contrast Narrative: Interpreted By: Marina Hawthorne, STUDY: CT HEAD WO IV CONTRAST; ; 1/14/2025 4:14 pm     INDICATION: Signs/Symptoms:tingling hands.     COMPARISON: 01/30/2007     ACCESSION NUMBER(S): [id number]     ORDERING CLINICIAN: MARK VOGEL     TECHNIQUE: Serial axial images of the head were obtained without intravenous contrast. Sagittal and coronal reconstructions were generated.     FINDINGS: The ventricles are midline and normal in size.     There are no acute parenchymal abnormalities.     There is no hemorrhage or extra-axial fluid.     There is no obvious scalp  hematoma or skull fracture.     The visualized portions of the paranasal sinuses and mastoids are unremarkable.     There is no obvious skull fracture or scalp hematoma.     COMPARISON OF FINDINGS: The brain is similar.     Impression: No acute intracranial abnormality.     MACRO: none     Signed by: Marina Hawthorne 1/14/2025 4:17 PM Dictation workstation: [id number] Vascular US lower extremity venous duplex bilateral Narrative: STUDY: Bilateral Lower Extremity Venous Doppler Ultrasound; 1/14/2025 3:32 PM INDICATION: BLE edema. COMPARISON: None available. ACCESSION NUMBER(S): [id number] ORDERING CLINICIAN: MARK VOGEL TECHNIQUE:     Real-time grayscale imaging, color Doppler flow imaging, and spectral Doppler imaging of the bilateral lower extremity veins was performed. FINDINGS: The bilateral common femoral, profunda femoral, femoral and popliteal veins demonstrated normal compressibility, normal phasic venous flow and normal response to augmentation. There is no evidence for echogenic thrombi. The visualized deep calf veins are patent.     Impression: No evidence for DVT within the bilateral lower extremities. Signed by Javad Graff MD XR chest 1 view Narrative: STUDY: Chest Radiograph; 1/14/2025 2:27PM INDICATION: Shortness of breath. COMPARISON: None Available ACCESSION NUMBER(S): [id number] ORDERING CLINICIAN: MARK VOGEL TECHNIQUE: Frontal chest was obtained at 14:25 hours. FINDINGS: CARDIOMEDIASTINAL SILHOUETTE: Cardiomediastinal silhouette is limits of normal for technique..     LUNGS: There is infiltrate in the right lower lobe. There is blunting the right costophrenic angle.     ABDOMEN: No remarkable upper abdominal findings.     BONES: No acute osseous changes. There is an old fracture the right clavicle. Impression: Right lower lobe infiltrate is small right pleural effusion. Signed by Raymond Gillis MD ECG 12 lead Atrial fibrillation Nonspecific ST and T wave abnormality Abnormal ECG No  previous ECGs available          ASSESSMENT & PLAN:     Acute pancreatitis Hemorrhagic pancreatic pseudocyst     Presenting from home with chief complaints of generalized weakness, shortness of breath however does have abdominal pain and tenderness to deep palpation     CT abdomen/pelvis with concerns for acute pancreatitis along with hemorrhagic pseudocyst and smaller pseudocyst     GI consulted     Continue IV antibiotics at this time to cover for pancreatic necrosis until further evaluation with cefepime, metronidazole     Pulmonary fibrosis     As seen on CTA chest\     Alcohol use disorder     Has not drink any alcohol since her most recent admission at [facility]     Leukocytosis, likely reactive to above     Essential hypertension Hyperlipidemia Type II DM     Resume home medications once reconciled      Malnutrition Diagnosis Status: New Malnutrition Diagnosis: Severe malnutrition related to acute disease or injury As Evidenced by: <50% of estimated energy requirements for > 5 day, moderate to severe fat loss, severe muscle losses I agree with the dietitian's malnutrition diagnosis.     VTE Prophylaxis: SCDs (holding chemoprophylaxis in setting of hemorrhagic pseudocyst)     Disposition/Daily update: Appreciate GI recommendations. No change in white count relatively. Continuing antibiotics as ordered. Started on a clear liquid diet.      Pulmonary comments: -At this time, I doubt we are dealing with pneumonia.  Okay to continue antibiotics as ordered for pancreatic pseudocyst.  As far as pulmonary fibrosis is concerned, the patient has had a poor-quality CT scan of the chest which she will need to be repeated down the line.  He has been counseled to quit using alcohol in view of alcohol related pancreatitis.  I shall continue to monitor this patient with you, and I thank you for the referral.  Plan of care also discussed with patient's daughter at the bedside   I spent 55 minutes in the professional and  overall care of this patient.      Tyler Stoner MD

## 2025-01-15 NOTE — PROGRESS NOTES
Occupational Therapy    Evaluation    Patient Name: Mary Burden  MRN: [id number]  Department: [facility]  Room: [facility] [place]1012-A  Today's Date: 1/15/2025  Time Calculation  Start Time: 1015  Stop Time: 1033  Time Calculation (min): 18 min      Assessment:  Prognosis: Good  Evaluation/Treatment Tolerance: Patient tolerated treatment well  End of Session Communication: Bedside nurse  End of Session Patient Position: Up in chair, Alarm on  OT Assessment Results: Decreased ADL status, Decreased safe judgment during ADL, Decreased endurance, Decreased functional mobility  Prognosis: Good  Evaluation/Treatment Tolerance: Patient tolerated treatment well  Plan:  Treatment Interventions: ADL retraining, Functional transfer training, Endurance training  OT Frequency: 2 times per week  OT Discharge Recommendations: Low intensity level of continued care  OT - OK to Discharge: Yes (when medically stable/cleared)    Subjective   Current Problem:  1. Community acquired pneumonia, unspecified laterality        2. Hypomagnesemia        3. Atrial fibrillation, unspecified type (Multi)          General:  General  Reason for Referral: ADL impairment  Referred By: OT/PT Serafin  Past Medical History Relevant to Rehab: HLD,HTN,DM2, GERD, Pulmonary fiborsis, alcohol use disorder.  Family/Caregiver Present: No  Co-Treatment: PT  Co-Treatment Reason: to maximize pt. safety  Prior to Session Communication: Bedside nurse  Patient Position Received: Bed, 3 rail up, Alarm on  General Comment: pt is a 92 yo female  came to the [facility] on 1/14 with reports of SOB and weakness.  dcx : community aquired PNA and acute pancreatitis  test/ labs: BNP : 338, CXR :  Right lower lobe infiltrates, small R pleural effusion.  US BLEs neg DVT,  CT angio chest/ CT ABD :  neg PE ,  pulmonary fibrosis,  acute pancreatitis , cirrhosis of the liver, moderate ascites, CT head neg .  Precautions:  Medical Precautions: Fall precautions    Pain:  Pain Assessment  Pain  Assessment: 0-10  0-10 (Numeric) Pain Score: 0 - No pain    Objective   Cognition:  Overall Cognitive Status: Within Functional Limits    Home Living:  Home Living Comments: pt lives alone in a 1 level home. 3 steps with HR to enter. pt has a tub/shower with seat. laundry on main floor.  Prior Function:  Prior Function Comments: per pt IND with mobility and gait has a QC she takes out in community .  also has a walker available.  mod I with adls and assistance with iadls.  no falls still drives    ADL:  Eating Assistance: Independent  Grooming Assistance: Stand by  Bathing Assistance: Stand by  UE Dressing Assistance: Independent  LE Dressing Assistance: Stand by  Toileting Assistance with Device: Stand by  Activity Tolerance:  Endurance: Endurance does not limit participation in activity  Bed Mobility/Transfers: Bed Mobility  Bed Mobility: Yes  Bed Mobility 1  Bed Mobility 1: Supine to sitting  Level of Assistance 1: Contact guard  Bed Mobility Comments 1: CGA for safety/steadying ; head of bed elevated    Transfers  Transfer: Yes  Transfer 1  Technique 1: Sit to stand  Transfer Device 1: Walker  Transfer Level of Assistance 1: Contact guard  Trials/Comments 1: CGA for steadying and VCs for safe hand placement    Functional Mobility:  Functional Mobility  Functional Mobility Performed:  (WW for functional distances in room and hallway ; Min A for safety and steadying.)    Standing Balance:  Dynamic Standing Balance  Dynamic Standing-Comments: Fair +     Strength:  Strength Comments: Joyce 5/5 MMT    Hand Function:  Gross Grasp: Functional  Extremities: RUE   RUE : Within Functional Limits and LUE   LUE: Within Functional Limits    Outcome Measures:[facility] Daily Activity  Putting on and taking off regular lower body clothing: A little  Bathing (including washing, rinsing, drying): A little  Putting on and taking off regular upper body clothing: None  Toileting, which includes using toilet, bedpan or urinal: A  little  Taking care of personal grooming such as brushing teeth: A little  Eating Meals: None  Daily Activity - Total Score: 20      Education Documentation  ADL Training, taught by Farrah Montero OT at 1/15/2025  2:01 PM.  Learner: Patient  Readiness: Acceptance  Method: Explanation  Response: Verbalizes Understanding      IP EDUCATION:  Education  Individual(s) Educated: Patient  Education Provided: Risk and benefits of OT discussed with patient or other, POC discussed and agreed upon, Fall precautons    Goals:  Encounter Problems       Encounter Problems (Active)       OT Goals       Mod I for all functional transfers  (Progressing)       Start:  01/15/25    Expected End:  01/29/25            Mod I for functional mobility household distances for ADL/IADL particicpation.  (Progressing)       Start:  01/15/25    Expected End:  01/29/25            Fair + dyn standing balance during ADLs and functional activities  (Progressing)       Start:  01/15/25    Expected End:  01/29/25            Independent for LB dressing  (Progressing)       Start:  01/15/25    Expected End:  01/29/25

## 2025-01-15 NOTE — CARE PLAN
Patient goals for this shift include full feeling in bilateral upper extremities and reduced feelings of weakness.  The patient made progress toward these goals including a decrease in hand tingling and an increase in rest.

## 2025-01-15 NOTE — CONSULTS
Department of Internal Medicine  Gastroenterology  Consult note    IMPRESSION/RECOMMENDATIONS  Acute pancreatitis with development of moderate to large size not walled off pseudocysts.  To be connected to the ascites   Alcohol cirrhosis with large amount of ascites  Alcohol abuse  Alcohol cirrhosis  Chronic thrombus of splenic vein    -If patient is hemodynamically stable and does not have signs of active GIB, will consider diagnostic and therapeutic paracentesis.  For now, we recommend conservative treatment for acute pancreatitis.  -Continue to monitor H&H and transfuse PRBC for Hgb <7.0  -Monitor stool color and consistency and document  -Recommend PPI 40 twice daily  -Serial abdominal exam  -If Hgb drops significantly or patient has hemodynamic instability consider stat CT pancreas to evaluate for hemorrhagic pseudocyst.  -MiraLAX 1-2 times daily  -Clear liquid diet  -Trend CMP, CBC  -Recommend IV fluids at 150 cc/h if okay with medicine  -Check triglycerides, IgG4, autoimmune markers  -Clear liquid diet, advance to full liquid diet as tolerated    Discussed with Dr. Cifuentes and Dr. Dickinson         Reason for Consult: pancreatitis, hemorrhagic pseudocyst     History of Present Illness   Mary Burden is a 93 y.o. female with a PMH of alcohol use disorder patient admits to drinking 2 beers daily for over 70 years, recent alcohol use pancreatitis, prolonged QT syndrome HTN, HLD, GERD presents to  EM with chief complaint of SOB and weakness X few days.  According to the family, patient was more tired had decreased appetite, no energy and numbness in both hands.  They also report having concern for possible convulsion without loss of consciousness.      On arrival to ED, patient was patient had a recent hospital stay at [facility] for acute pancreatitis, etiology unknown.  Afebrile, tachycardic with heart rate 113, and normotensive.  Serum sodium and potassium normal.  Bicarb 23, normal BUN and creatinine at 22 and  0.78.  Calcium 8.1.  Albumin 2.8.  Total bilirubin elevated at 1.5, alkaline phosphatase, ALT/AST normal.  Lactate 2.7 and .  Lipase 34.  INR 1.6.  WBC 22.4, hemoglobin 11.5, hematocrit 35%, MCV 91, and normal platelets.  CT angio of the abdomen shows acute pancreatitis with development of pseudocyst off of the pancreatic head.  The larger pseudocyst displays a small focus of internal hemorrhage.  Cirrhotic morphology of the liver with cavernous transformation of the portal vein.  Chronic thrombus of the splenic vein.  Cholelithiasis.  Mild to moderate abdominal ascites.  Anasarca.  Aneurysmal dilation of the abdominal aorta.  Colonic diverticulosis with no evidence of diverticulitis.    GI has been consulted for acute pancreatitis.  Patient seen and evaluated at bedside.  Patient was hospitalized [facility] 12/28 to 1/1. Admitted for acute pancreatitis likely in setting of alcohol use.  Also found to have cholelithiasis.  Seen by GI/general surgery who recommended outpatient follow-up and further intervention.  CT abdomen/pelvis at that time showed acute pancreatitis otherwise unremarkable.  On exam, patient is thin, appears malnourished.  Patient's abdomen is distended, soft, diffuse tenderness to touch.  Bowel sounds present.  No guarding.  She is afebrile and hemodynamically stable.  No lower leg edema.  No jaundice.  Reports no appetite.    ENDOSCOPIC REVIEW  No records were found    Allergies  Amoxicillin and Codeine    Current Medication    Current Facility-Administered Medications:     acetaminophen (Tylenol) tablet 650 mg, 650 mg, oral, q4h PRN **OR** acetaminophen (Tylenol) oral liquid 650 mg, 650 mg, nasogastric tube, q4h PRN **OR** acetaminophen (Tylenol) suppository 650 mg, 650 mg, rectal, q4h PRN, Brady Betancourt MD    cefepime (Maxipime) 1 g in dextrose 5% IV 50 mL, 1 g, intravenous, q12h, Brady Betancourt MD, Stopped at 01/15/25 0540    melatonin tablet 5 mg, 5 mg, oral, Nightly PRN, [employer]  "MD Mamadou    metoprolol succinate XL (Toprol-XL) 24 hr tablet 37.5 mg, 37.5 mg, oral, Daily, Brady Betancourt MD    metroNIDAZOLE (Flagyl) 500 mg in sodium chloride (iso)  mL, 500 mg, intravenous, q8h, Brady Betancourt MD, Stopped at 01/15/25 0539    mirtazapine (Remeron) tablet 15 mg, 15 mg, oral, Nightly, Brady Betancourt MD    nystatin (Mycostatin) 100,000 unit/gram powder 1 Application, 1 Application, Topical, BID, [employer] Cedillo MD    pantoprazole (ProtoNix) EC tablet 40 mg, 40 mg, oral, Daily before breakfast, Brady Betancourt MD    polyethylene glycol (Glycolax, Miralax) packet 17 g, 17 g, oral, Daily PRN, Brady Betancourt MD    rosuvastatin (Crestor) tablet 10 mg, 10 mg, oral, Nightly, Brady Betancourt MD    Past Medical History  Active Ambulatory Problems     Diagnosis Date Noted    No Active Ambulatory Problems     Resolved Ambulatory Problems     Diagnosis Date Noted    No Resolved Ambulatory Problems     Past Medical History:   Diagnosis Date    Hypertension     Pancreatitis ([facility]HS-HCC)        Past Surgical History  History reviewed. No pertinent surgical history.    Family History  No family history of GI malignancies     Social History  TOBACCO:  reports that she has never smoked. She has never been exposed to tobacco smoke. She has never used smokeless tobacco.  ETOH: Patient drinks at least 2 beers daily for the last 70 years  DRUGS:  reports no history of drug use.    Review of Systems  Review of systems negative unless otherwise stated above.    PHYSICAL EXAM  VS: /59 (BP Location: Right arm, Patient Position: Lying)   Pulse 85   Temp 36.4 °C (97.5 °F) (Temporal)   Resp 18   Ht 1.6 m (5' 3\")   Wt 45.4 kg (100 lb)   SpO2 99%   BMI 17.71 kg/m²  Body mass index is 17.71 kg/m².  Constitutional:       General: She is not in acute distress.     Appearance: Normal appearance. She is not toxic-appearing.   HENT:      Head: Normocephalic and atraumatic.      Nose: Nose normal.      Mouth/Throat: "      Mouth: Mucous membranes are moist.      Pharynx: Oropharynx is clear.      Comments: No upper teeth, lower teeth missing and have cavities/decay.  Eyes:      Extraocular Movements: Extraocular movements intact.      Conjunctiva/sclera: Conjunctivae normal.      Pupils: Pupils are equal, round, and reactive to light.   Cardiovascular:      Rate and Rhythm: Normal rate and regular rhythm.      Pulses: Normal pulses.      Heart sounds: Normal heart sounds.   Pulmonary:      Effort: Pulmonary effort is normal. No respiratory distress.      Breath sounds: No wheezing.      Comments: No acute respiratory distress.  Breath sounds diminished to auscultation bilaterally.  Abdominal:      General: Bowel sounds are normal. There is no distension.      Palpations: Abdomen is soft.      Tenderness: There is abdominal tenderness. There is no guarding.      Comments: Abdomen soft, bowel sounds are present.  Diffusely tender to deep palpation.  No rebound or guarding.   Musculoskeletal:         General: Normal range of motion.      Cervical back: Normal range of motion and neck supple.   Neurological:      General: No focal deficit present.      Mental Status: She is alert and oriented to person, place, and time. Mental status is at baseline.   Psychiatric:         Mood and Affect: Mood normal.         Behavior: Behavior normal.         Thought Content: Thought content normal.     DATA  Recent blood work and relevant radiology and endoscopic studies were reviewed and discussed with the patient   Results from last 7 days   Lab Units 01/15/25  0444   WBC AUTO x10*3/uL 22.1*   RBC AUTO x10*6/uL 3.68*   HEMOGLOBIN g/dL 11.2*   HEMATOCRIT % 33.4*   MCV fL 91   MCHC g/dL 33.5   RDW % 14.0   PLATELETS AUTO x10*3/uL 223       Results from last 72 hours   Lab Units 01/15/25  0444   SODIUM mmol/L 136   POTASSIUM mmol/L 3.9   CHLORIDE mmol/L 104   CO2 mmol/L 21   BUN mg/dL 22   CREATININE mg/dL 0.74   CALCIUM mg/dL 8.1*   PROTEIN TOTAL  g/dL 5.5*   BILIRUBIN TOTAL mg/dL 1.5*   ALK PHOS U/L 33   AST U/L 18   ALT U/L 6*       Results from last 72 hours   Lab Units 01/14/25  1425   INR  1.6*         RADIOLOGY REVIEW  === 01/14/25 ===    CT ABDOMEN PELVIS W IV CONTRAST    - Impression -  CHEST:  1.  No evidence of a pulmonary embolus.  2.  Emphysematous changes.  3.  Peripheral interstitial markings, most likely a chronic process  such as fibrosis.  4.  Tiny right pleural effusion.  5.  Coronary artery calcifications.  6.  Aneurysmal dilatation of the thoracic aorta.  ABDOMEN/PELVIS:  1.  Acute pancreatitis, with development of pseudocysts off of the  pancreatic head.  The larger pseudocyst displays a small focus of  internal hemorrhage.  Correlation with serum CBC is recommended.  2.  Cirrhotic morphology of the liver, with cavernous transformation  of the portal vein.  3.  Chronic thrombosis of the splenic vein.  4.  Cholelithiasis.  5.  Mild to moderate abdominal ascites.  6.  Anasarca.  7.  Aneurysmal dilatation of the abdominal aorta.  8.  Colonic diverticulosis, no evidence of diverticulitis.  This report was called to Dr. Ollie Bernstein at 4:55 PM on January 14, 2025.  Signed by Jonathan Farias MD       (Electronically signed byVICKIE Flood on 1/15/2025 at 9:04 AM)

## 2025-01-15 NOTE — CARE PLAN
Problem: Fall/Injury  Goal: Not fall by end of shift  Outcome: Progressing  Goal: Be free from injury by end of the shift  Outcome: Progressing  Goal: Verbalize understanding of personal risk factors for fall in the hospital  Outcome: Progressing     Problem: Pain - Adult  Goal: Verbalizes/displays adequate comfort level or baseline comfort level  Outcome: Progressing     Problem: Safety - Adult  Goal: Free from fall injury  Outcome: Progressing     Problem: Discharge Planning  Goal: Discharge to home or other facility with appropriate resources  Outcome: Progressing     Problem: Chronic Conditions and Co-morbidities  Goal: Patient's chronic conditions and co-morbidity symptoms are monitored and maintained or improved  Outcome: Progressing     Problem: Skin  Goal: Decreased wound size/increased tissue granulation at next dressing change  Outcome: Progressing  Goal: Participates in plan/prevention/treatment measures  Outcome: Progressing  Goal: Prevent/manage excess moisture  Outcome: Progressing  Goal: Prevent/minimize sheer/friction injuries  Outcome: Progressing  Goal: Promote/optimize nutrition  Outcome: Progressing  Goal: Promote skin healing  Outcome: Progressing     Problem: Nutrition  Goal: Less than 5 days NPO/clear liquids  Outcome: Progressing  Goal: Oral intake greater than 50%  Outcome: Progressing  Goal: Oral intake greater 75%  Outcome: Progressing  Goal: Consume prescribed supplement  Outcome: Progressing  Goal: Adequate PO fluid intake  Outcome: Progressing  Goal: Nutrition support goals are met within 48 hrs  Outcome: Progressing  Goal: Nutrition support is meeting 75% of nutrient needs  Outcome: Progressing  Goal: Tube feed tolerance  Outcome: Progressing  Goal: BG  mg/dL  Outcome: Progressing  Goal: Lab values WNL  Outcome: Progressing  Goal: Electrolytes WNL  Outcome: Progressing  Goal: Promote healing  Outcome: Progressing  Goal: Maintain stable weight  Outcome: Progressing  Goal: Reduce  weight from edema/fluid  Outcome: Progressing  Goal: Gradual weight gain  Outcome: Progressing  Goal: Improve ostomy output  Outcome: Progressing   The patient's goals for the shift include rest     The clinical goals for the shift include patient will remain free from injury throughout shift

## 2025-01-15 NOTE — PROGRESS NOTES
Medical Group Progress Note  ASSESSMENT & PLAN:     Acute pancreatitis  Hemorrhagic pancreatic pseudocyst  - Presenting from home with chief complaints of generalized weakness, shortness of breath however does have abdominal pain and tenderness to deep palpation  - CT abdomen/pelvis with concerns for acute pancreatitis along with hemorrhagic pseudocyst and smaller pseudocyst  - GI consulted  - Continue IV antibiotics at this time to cover for pancreatic necrosis  until further evaluation with cefepime, metronidazole  - LR 100ml/hr  - Watching hemoglobin - 11.2 today (11.5 on admission)    Ascites  - As seen on imaging  - Unclear if the pancreatic pseudocysts are leaking into the abdomen  - IR for paracentesis     Pulmonary fibrosis  - As seen on CTA chest  - Pulmonology consulted     Alcohol use disorder  - Has not drink any alcohol since her most recent admission at [facility]     Leukocytosis, likely reactive to above     Essential hypertension  Hyperlipidemia  Type II DM  - Resume home medications once reconciled       Malnutrition Diagnosis Status: New  Malnutrition Diagnosis: Severe malnutrition related to acute disease or injury  As Evidenced by: <50% of estimated energy requirements for > 5 day, moderate to severe fat loss, severe muscle losses  I agree with the dietitian's malnutrition diagnosis.    VTE Prophylaxis: SCDs (holding chemoprophylaxis in setting of hemorrhagic pseudocyst)    Disposition/Daily update: Appreciate GI and pulmonology recommendations.  No change in white count relatively.  Continuing antibiotics as ordered. Started on clear liquid diet.      ---Of note, this documentation is completed using the Dragon Dictation system (voice recognition software). There may be spelling and/or grammatical errors that were not corrected prior to final submission.---    Brady Betancourt MD    SUBJECTIVE     Patient was seen and examined bedside this morning.  States having slight abdominal pain  otherwise unremarkable.    OBJECTIVE:     Last Recorded Vitals:  Vitals:    01/14/25 2343 01/15/25 0354 01/15/25 0734 01/15/25 1603   BP: 107/59 110/59 124/59 112/62   BP Location: Right arm  Right arm Right arm   Patient Position: Lying  Lying Lying   Pulse: 87 85 85 91   Resp: 16 18 18 18   Temp: 36.6 °C (97.9 °F) 36 °C (96.8 °F) 36.4 °C (97.5 °F) 37 °C (98.6 °F)   TempSrc: Temporal  Temporal Temporal   SpO2: 99% 99% 99% 98%   Weight:       Height:         Last I/O:  I/O last 3 completed shifts:  In: 750 (16.5 mL/kg) [I.V.:50 (1.1 mL/kg); IV Piggyback:700]  Out: 400 (8.8 mL/kg) [Urine:400 (0.2 mL/kg/hr)]  Weight: 45.4 kg     Physical Exam  Vitals reviewed.   Constitutional:       General: She is not in acute distress.     Appearance: Normal appearance. She is not toxic-appearing.   HENT:      Head: Normocephalic and atraumatic.   Eyes:      Extraocular Movements: Extraocular movements intact.      Conjunctiva/sclera: Conjunctivae normal.   Cardiovascular:      Rate and Rhythm: Normal rate and regular rhythm.      Pulses: Normal pulses.      Heart sounds: Normal heart sounds.   Pulmonary:      Effort: Pulmonary effort is normal. No respiratory distress.      Breath sounds: Normal breath sounds. No wheezing.   Abdominal:      General: Bowel sounds are normal. There is no distension.      Palpations: Abdomen is soft.      Tenderness: There is abdominal tenderness. There is no guarding.   Musculoskeletal:         General: Normal range of motion.      Cervical back: Normal range of motion and neck supple.   Neurological:      General: No focal deficit present.      Mental Status: She is alert and oriented to person, place, and time. Mental status is at baseline.   Psychiatric:         Mood and Affect: Mood normal.         Behavior: Behavior normal.         Thought Content: Thought content normal.     Inpatient Medications:  cefepime, 1 g, intravenous, q12h  metoprolol succinate XL, 37.5 mg, oral, Daily  metroNIDAZOLE,  500 mg, intravenous, q8h  mirtazapine, 15 mg, oral, Nightly  nystatin, 1 Application, Topical, BID  pantoprazole, 40 mg, oral, Daily before breakfast  rosuvastatin, 10 mg, oral, Nightly    PRN Medications  PRN medications: acetaminophen **OR** acetaminophen **OR** acetaminophen, melatonin, polyethylene glycol  Continuous Medications:     LABS AND IMAGING:     Labs:  Results from last 7 days   Lab Units 01/15/25  0444 01/14/25  1425   WBC AUTO x10*3/uL 22.1* 22.4*   RBC AUTO x10*6/uL 3.68* 3.83*   HEMOGLOBIN g/dL 11.2* 11.5*   HEMATOCRIT % 33.4* 35.0*   MCV fL 91 91   MCH pg 30.4 30.0   MCHC g/dL 33.5 32.9   RDW % 14.0 13.9   PLATELETS AUTO x10*3/uL 223 300     Results from last 7 days   Lab Units 01/15/25  0444 01/14/25  1425   SODIUM mmol/L 136 138   POTASSIUM mmol/L 3.9 4.0   CHLORIDE mmol/L 104 105   CO2 mmol/L 21 23   BUN mg/dL 22 22   CREATININE mg/dL 0.74 0.78   GLUCOSE mg/dL 87 87   PROTEIN TOTAL g/dL 5.5* 5.6*   CALCIUM mg/dL 8.1* 8.1*   BILIRUBIN TOTAL mg/dL 1.5* 1.5*   ALK PHOS U/L 33 35   AST U/L 18 17   ALT U/L 6* 8     Results from last 7 days   Lab Units 01/15/25  0444 01/14/25  1425   MAGNESIUM mg/dL 1.97 1.41*     Results from last 7 days   Lab Units 01/14/25  1620 01/14/25  1425   TROPHS ng/L 12 10     Imaging:  CT abdomen pelvis w IV contrast  Narrative: STUDY:  CT Angiogram of the Chest, CT Abdomen and Pelvis with IV Contrast;  01/14/2025 at 4:12 PM  INDICATION:  Shortness of breath. Leg swelling. Lower abdominal pain.   COMPARISON:  XR chest 01/14/25.  ACCESSION NUMBER(S):  [id number], [id number]  ORDERING CLINICIAN:  MARK VOGEL  TECHNIQUE:  CTA of the chest was performed following rapid injection  of intravenous contrast.  Images are reviewed and processed at a  workstation according to the CT angiogram protocol with 3-D and/or MIP  post processing imaging generated.  CT of the abdomen and pelvis was  performed with intravenous contrast.  Omnipaque-350 75 mL was  administered  intravenously; positive oral contrast was given.  Automated mA/kV exposure control was utilized and patient examination  was performed in strict accordance with principles of ALARA.  FINDINGS:    No filling defects are seen within the main pulmonary artery, left or  right pulmonary arteries, or first order branches to indicate an  embolus.  No early filling veins are visualized indicate an  arteriovenous malformation.  Emphysematous changes are seen within the  lungs bilaterally.  There are peripheral interstitial markings  bilaterally, which may indicate a chronic process such as fibrosis.   There is a tiny right pleural effusion.  No enlarged lymph nodes are  seen in the mediastinal or hilar regions.  There is no pericardial  effusion.  Coronary artery calcifications are present.  There is  aneurysmal dilatation of the ascending aorta at 4.3 x 4.0 cm.  The  aortic arch is measured at 3.4 cm.  The proximal descending thoracic  aorta is measured at 3.2 cm.  The distal descending thoracic aorta is  measured at 2.6 cm.  Degenerative changes are seen throughout the  thoracic spine.    There is a nodular contour of the liver, indicative of cirrhosis.   There appears be partial cavernous transformation of the portal vein.  The hepatic veins appear patent.  Gallstones are present.  There is  moderate distention of the gallbladder.  The spleen is not enlarged.   There appears to be chronic thrombosis of the splenic vein.  There are  inflammatory changes surrounding the pancreas, indicative of acute  hepatitis.  There are multiple fluid collections are visualized,  indicative of cirrhosis.  There appears be a hemorrhagic pseudocyst  posterior to the head measured at 5.2 x 4.4 x 9.9 cm(Series 602, Image  58; Series 601, Image 53).  The hemorrhagic component is seen in the  superior aspect of the pseudocyst is estimated 1.8 x 2.0 cm.  A  smaller pseudocyst is seen anterior to the pancreatic head measured at  1.5 x 1.6  cm(Series 601, Image 49).  No adrenal nodule is noted.  No  acute findings are seen within either kidney.  There is a mild to  moderate amount of abdominal ascites.  No dilated loops of small bowel  or colon are visually.  There are diverticula seen throughout the  colon.  There is no evidence of diverticulitis.  The appendix is  unremarkable in appearance.  No enlarged lymph nodes are seen in the  pelvic sidewalls, iliac chains, or retroperitoneum.  There is  aneurysmal dilatation of the abdominal aorta at 3.2 cm.  No prominent  edema is seen within the psoas musculature.  There is generalized  anasarca.  Degenerative changes are seen throughout the lumbar spine.  Impression: CHEST:  1.  No evidence of a pulmonary embolus.  2.  Emphysematous changes.  3.  Peripheral interstitial markings, most likely a chronic process  such as fibrosis.  4.  Tiny right pleural effusion.  5.  Coronary artery calcifications.  6.  Aneurysmal dilatation of the thoracic aorta.  ABDOMEN/PELVIS:  1.  Acute pancreatitis, with development of pseudocysts off of the  pancreatic head.  The larger pseudocyst displays a small focus of  internal hemorrhage.  Correlation with serum CBC is recommended.  2.  Cirrhotic morphology of the liver, with cavernous transformation  of the portal vein.  3.  Chronic thrombosis of the splenic vein.  4.  Cholelithiasis.  5.  Mild to moderate abdominal ascites.  6.  Anasarca.  7.  Aneurysmal dilatation of the abdominal aorta.  8.  Colonic diverticulosis, no evidence of diverticulitis.  This report was called to Dr. Ollie Bernstein at 4:55 PM on January 14, 2025.  Signed by Jonathan Farias MD  CT angio chest for pulmonary embolism  Narrative: STUDY:  CT Angiogram of the Chest, CT Abdomen and Pelvis with IV Contrast;  01/14/2025 at 4:12 PM  INDICATION:  Shortness of breath. Leg swelling. Lower abdominal pain.   COMPARISON:  XR chest 01/14/25.  ACCESSION NUMBER(S):  [id number], [id number]  ORDERING  CLINICIAN:  MARK VOGEL  TECHNIQUE:  CTA of the chest was performed following rapid injection  of intravenous contrast.  Images are reviewed and processed at a  workstation according to the CT angiogram protocol with 3-D and/or MIP  post processing imaging generated.  CT of the abdomen and pelvis was  performed with intravenous contrast.  Omnipaque-350 75 mL was  administered intravenously; positive oral contrast was given.  Automated mA/kV exposure control was utilized and patient examination  was performed in strict accordance with principles of ALARA.  FINDINGS:    No filling defects are seen within the main pulmonary artery, left or  right pulmonary arteries, or first order branches to indicate an  embolus.  No early filling veins are visualized indicate an  arteriovenous malformation.  Emphysematous changes are seen within the  lungs bilaterally.  There are peripheral interstitial markings  bilaterally, which may indicate a chronic process such as fibrosis.   There is a tiny right pleural effusion.  No enlarged lymph nodes are  seen in the mediastinal or hilar regions.  There is no pericardial  effusion.  Coronary artery calcifications are present.  There is  aneurysmal dilatation of the ascending aorta at 4.3 x 4.0 cm.  The  aortic arch is measured at 3.4 cm.  The proximal descending thoracic  aorta is measured at 3.2 cm.  The distal descending thoracic aorta is  measured at 2.6 cm.  Degenerative changes are seen throughout the  thoracic spine.    There is a nodular contour of the liver, indicative of cirrhosis.   There appears be partial cavernous transformation of the portal vein.  The hepatic veins appear patent.  Gallstones are present.  There is  moderate distention of the gallbladder.  The spleen is not enlarged.   There appears to be chronic thrombosis of the splenic vein.  There are  inflammatory changes surrounding the pancreas, indicative of acute  hepatitis.  There are multiple fluid collections  are visualized,  indicative of cirrhosis.  There appears be a hemorrhagic pseudocyst  posterior to the head measured at 5.2 x 4.4 x 9.9 cm(Series 602, Image  58; Series 601, Image 53).  The hemorrhagic component is seen in the  superior aspect of the pseudocyst is estimated 1.8 x 2.0 cm.  A  smaller pseudocyst is seen anterior to the pancreatic head measured at  1.5 x 1.6 cm(Series 601, Image 49).  No adrenal nodule is noted.  No  acute findings are seen within either kidney.  There is a mild to  moderate amount of abdominal ascites.  No dilated loops of small bowel  or colon are visually.  There are diverticula seen throughout the  colon.  There is no evidence of diverticulitis.  The appendix is  unremarkable in appearance.  No enlarged lymph nodes are seen in the  pelvic sidewalls, iliac chains, or retroperitoneum.  There is  aneurysmal dilatation of the abdominal aorta at 3.2 cm.  No prominent  edema is seen within the psoas musculature.  There is generalized  anasarca.  Degenerative changes are seen throughout the lumbar spine.  Impression: CHEST:  1.  No evidence of a pulmonary embolus.  2.  Emphysematous changes.  3.  Peripheral interstitial markings, most likely a chronic process  such as fibrosis.  4.  Tiny right pleural effusion.  5.  Coronary artery calcifications.  6.  Aneurysmal dilatation of the thoracic aorta.  ABDOMEN/PELVIS:  1.  Acute pancreatitis, with development of pseudocysts off of the  pancreatic head.  The larger pseudocyst displays a small focus of  internal hemorrhage.  Correlation with serum CBC is recommended.  2.  Cirrhotic morphology of the liver, with cavernous transformation  of the portal vein.  3.  Chronic thrombosis of the splenic vein.  4.  Cholelithiasis.  5.  Mild to moderate abdominal ascites.  6.  Anasarca.  7.  Aneurysmal dilatation of the abdominal aorta.  8.  Colonic diverticulosis, no evidence of diverticulitis.  This report was called to Dr. Ollie Bernstein at 4:55 PM on  January 14, 2025.  Signed by Jonathan Farias MD  CT head wo IV contrast  Narrative: Interpreted By:  Marina Hawthorne,   STUDY:  CT HEAD WO IV CONTRAST; ;  1/14/2025 4:14 pm      INDICATION:  Signs/Symptoms:tingling hands.      COMPARISON:  01/30/2007      ACCESSION NUMBER(S):  [id number]      ORDERING CLINICIAN:  MARK VOGEL      TECHNIQUE:  Serial axial images of the head were obtained without intravenous  contrast. Sagittal and coronal reconstructions were generated.      FINDINGS:  The ventricles are midline and normal in size.      There are no acute parenchymal abnormalities.      There is no hemorrhage or extra-axial fluid.      There is no obvious scalp hematoma or skull fracture.      The visualized portions of the paranasal sinuses and mastoids are  unremarkable.      There is no obvious skull fracture or scalp hematoma.      COMPARISON OF FINDINGS:  The brain is similar.      Impression: No acute intracranial abnormality.          MACRO:  none      Signed by: Marina Hawthorne 1/14/2025 4:17 PM  Dictation workstation:   [id number]  Vascular US lower extremity venous duplex bilateral  Narrative: STUDY:  Bilateral Lower Extremity Venous Doppler Ultrasound; 1/14/2025 3:32 PM  INDICATION:  BLE edema.  COMPARISON:  None available.  ACCESSION NUMBER(S):  [id number]  ORDERING CLINICIAN:  MARK VOGEL  TECHNIQUE:    Real-time grayscale imaging, color Doppler flow imaging, and spectral  Doppler imaging of the bilateral lower extremity veins was performed.  FINDINGS:   The bilateral common femoral, profunda femoral, femoral and popliteal  veins demonstrated normal compressibility, normal phasic venous flow  and normal response to augmentation.  There is no evidence for  echogenic thrombi.  The visualized deep calf veins are patent.    Impression: No evidence for DVT within the bilateral lower extremities.  Signed by Javad Graff MD  XR chest 1 view  Narrative: STUDY:  Chest Radiograph;  1/14/2025  2:27PM  INDICATION:  Shortness of breath.  COMPARISON:  None Available  ACCESSION NUMBER(S):  [id number]  ORDERING CLINICIAN:  MARK VOGEL  TECHNIQUE:  Frontal chest was obtained at 14:25 hours.  FINDINGS:  CARDIOMEDIASTINAL SILHOUETTE:  Cardiomediastinal silhouette is limits of normal for technique..     LUNGS:  There is infiltrate in the right lower lobe.  There is blunting the  right costophrenic angle.     ABDOMEN:  No remarkable upper abdominal findings.     BONES:  No acute osseous changes.  There is an old fracture the right  clavicle.  Impression: Right lower lobe infiltrate is small right pleural effusion.  Signed by Raymond Gillis MD  ECG 12 lead  Atrial fibrillation  Nonspecific ST and T wave abnormality  Abnormal ECG  No previous ECGs available

## 2025-01-15 NOTE — PROGRESS NOTES
01/15/25 0912   Discharge Planning   Living Arrangements Alone   Support Systems Family members;Children   Assistance Needed TBD   Type of Residence Private residence   Number of Stairs to Enter Residence 0   Number of Stairs Within Residence 0   Do you have animals or pets at home? No   Who is requesting discharge planning? Provider   Home or Post Acute Services In home services   Type of Home Care Services Home nursing visits;Home OT;Home PT   Expected Discharge Disposition Home H   Does the patient need discharge transport arranged? Yes   RoundTrip coordination needed? Yes   Has discharge transport been arranged? No   Patient Choice   Provider Choice list and CMS website (https://medicare.gov/care-compare#search) for post-acute Quality and Resource Measure Data were provided and reviewed with: Family   Patient / Family choosing to utilize agency / facility established prior to hospitalization No   Stroke Family Assessment   Stroke Family Assessment Needed No   Intensity of Service   Intensity of Service 0-30 min     Patient admitted from home with community acquired pneumonia, weakness. Patient recently at [facility] for gallbladder issue. Patient remains sleeping this morning, will speak with patient's daughters today to go over discharge planning. Patient was home, plan may be home with [facility]. CT team will continue to follow.

## 2025-01-15 NOTE — CARE PLAN
The patient's goals for the shift include patient will be able to urinate without difficulty     The clinical goals for the shift include full feeling bilateral upper extremities throughout shift and decreased weakness

## 2025-01-16 ENCOUNTER — APPOINTMENT (OUTPATIENT)
Dept: RADIOLOGY | Facility: HOSPITAL | Age: OVER 89
DRG: 438 | End: 2025-01-16
Payer: MEDICARE

## 2025-01-16 ENCOUNTER — APPOINTMENT (OUTPATIENT)
Dept: CARDIOLOGY | Facility: HOSPITAL | Age: OVER 89
DRG: 438 | End: 2025-01-16
Payer: MEDICARE

## 2025-01-16 LAB
ANION GAP SERPL CALC-SCNC: 12 MMOL/L (ref 10–20)
APAP SERPL-MCNC: <10 UG/ML
ATRIAL RATE: 83 BPM
BASOPHILS # BLD AUTO: 0.02 X10*3/UL (ref 0–0.1)
BASOPHILS NFR BLD AUTO: 0.1 %
BUN SERPL-MCNC: 26 MG/DL (ref 6–23)
CALCIUM SERPL-MCNC: 7.7 MG/DL (ref 8.6–10.3)
CHLORIDE SERPL-SCNC: 104 MMOL/L (ref 98–107)
CO2 SERPL-SCNC: 24 MMOL/L (ref 21–32)
CREAT SERPL-MCNC: 0.74 MG/DL (ref 0.5–1.05)
EGFRCR SERPLBLD CKD-EPI 2021: 76 ML/MIN/1.73M*2
EOSINOPHIL # BLD AUTO: 0.08 X10*3/UL (ref 0–0.4)
EOSINOPHIL NFR BLD AUTO: 0.5 %
ERYTHROCYTE [DISTWIDTH] IN BLOOD BY AUTOMATED COUNT: 13.7 % (ref 11.5–14.5)
ETHANOL SERPL-MCNC: <10 MG/DL
GLUCOSE SERPL-MCNC: 129 MG/DL (ref 74–99)
HCT VFR BLD AUTO: 29.5 % (ref 36–46)
HGB BLD-MCNC: 10 G/DL (ref 12–16)
HOLD SPECIMEN: NORMAL
HOLD SPECIMEN: NORMAL
IMM GRANULOCYTES # BLD AUTO: 0.08 X10*3/UL (ref 0–0.5)
IMM GRANULOCYTES NFR BLD AUTO: 0.5 % (ref 0–0.9)
LYMPHOCYTES # BLD AUTO: 0.48 X10*3/UL (ref 0.8–3)
LYMPHOCYTES NFR BLD AUTO: 3.2 %
MAGNESIUM SERPL-MCNC: 1.87 MG/DL (ref 1.6–2.4)
MCH RBC QN AUTO: 30.1 PG (ref 26–34)
MCHC RBC AUTO-ENTMCNC: 33.9 G/DL (ref 32–36)
MCV RBC AUTO: 89 FL (ref 80–100)
MONOCYTES # BLD AUTO: 0.93 X10*3/UL (ref 0.05–0.8)
MONOCYTES NFR BLD AUTO: 6.2 %
NEUTROPHILS # BLD AUTO: 13.49 X10*3/UL (ref 1.6–5.5)
NEUTROPHILS NFR BLD AUTO: 89.5 %
NRBC BLD-RTO: 0 /100 WBCS (ref 0–0)
P AXIS: 44 DEGREES
P OFFSET: 167 MS
P ONSET: 118 MS
PLATELET # BLD AUTO: 238 X10*3/UL (ref 150–450)
POTASSIUM SERPL-SCNC: 3.6 MMOL/L (ref 3.5–5.3)
PR INTERVAL: 190 MS
Q ONSET: 213 MS
QRS COUNT: 13 BEATS
QRS DURATION: 92 MS
QT INTERVAL: 392 MS
QTC CALCULATION(BAZETT): 460 MS
QTC FREDERICIA: 437 MS
R AXIS: 9 DEGREES
RBC # BLD AUTO: 3.32 X10*6/UL (ref 4–5.2)
SALICYLATES SERPL-MCNC: <3 MG/DL
SODIUM SERPL-SCNC: 136 MMOL/L (ref 136–145)
T AXIS: 65 DEGREES
T OFFSET: 409 MS
VENTRICULAR RATE: 83 BPM
WBC # BLD AUTO: 15.1 X10*3/UL (ref 4.4–11.3)

## 2025-01-16 PROCEDURE — 2500000004 HC RX 250 GENERAL PHARMACY W/ HCPCS (ALT 636 FOR OP/ED): Performed by: STUDENT IN AN ORGANIZED HEALTH CARE EDUCATION/TRAINING PROGRAM

## 2025-01-16 PROCEDURE — 2500000001 HC RX 250 WO HCPCS SELF ADMINISTERED DRUGS (ALT 637 FOR MEDICARE OP): Performed by: STUDENT IN AN ORGANIZED HEALTH CARE EDUCATION/TRAINING PROGRAM

## 2025-01-16 PROCEDURE — 97530 THERAPEUTIC ACTIVITIES: CPT | Mod: GP,CQ

## 2025-01-16 PROCEDURE — 36415 COLL VENOUS BLD VENIPUNCTURE: CPT | Performed by: STUDENT IN AN ORGANIZED HEALTH CARE EDUCATION/TRAINING PROGRAM

## 2025-01-16 PROCEDURE — 93005 ELECTROCARDIOGRAM TRACING: CPT

## 2025-01-16 PROCEDURE — 1210000001 HC SEMI-PRIVATE ROOM DAILY

## 2025-01-16 PROCEDURE — 80048 BASIC METABOLIC PNL TOTAL CA: CPT | Performed by: STUDENT IN AN ORGANIZED HEALTH CARE EDUCATION/TRAINING PROGRAM

## 2025-01-16 PROCEDURE — 99232 SBSQ HOSP IP/OBS MODERATE 35: CPT | Performed by: STUDENT IN AN ORGANIZED HEALTH CARE EDUCATION/TRAINING PROGRAM

## 2025-01-16 PROCEDURE — 97530 THERAPEUTIC ACTIVITIES: CPT | Mod: GO

## 2025-01-16 PROCEDURE — 86038 ANTINUCLEAR ANTIBODIES: CPT | Mod: ELYLAB | Performed by: NURSE PRACTITIONER

## 2025-01-16 PROCEDURE — 76705 ECHO EXAM OF ABDOMEN: CPT | Performed by: RADIOLOGY

## 2025-01-16 PROCEDURE — 2500000004 HC RX 250 GENERAL PHARMACY W/ HCPCS (ALT 636 FOR OP/ED): Performed by: NURSE PRACTITIONER

## 2025-01-16 PROCEDURE — 99232 SBSQ HOSP IP/OBS MODERATE 35: CPT | Performed by: NURSE PRACTITIONER

## 2025-01-16 PROCEDURE — 83735 ASSAY OF MAGNESIUM: CPT | Performed by: STUDENT IN AN ORGANIZED HEALTH CARE EDUCATION/TRAINING PROGRAM

## 2025-01-16 PROCEDURE — 86015 ACTIN ANTIBODY EACH: CPT | Mod: ELYLAB | Performed by: NURSE PRACTITIONER

## 2025-01-16 PROCEDURE — 97110 THERAPEUTIC EXERCISES: CPT | Mod: GO

## 2025-01-16 PROCEDURE — 85025 COMPLETE CBC W/AUTO DIFF WBC: CPT | Performed by: STUDENT IN AN ORGANIZED HEALTH CARE EDUCATION/TRAINING PROGRAM

## 2025-01-16 PROCEDURE — 76705 ECHO EXAM OF ABDOMEN: CPT

## 2025-01-16 PROCEDURE — 80320 DRUG SCREEN QUANTALCOHOLS: CPT | Performed by: NURSE PRACTITIONER

## 2025-01-16 PROCEDURE — 86381 MITOCHONDRIAL ANTIBODY EACH: CPT | Mod: ELYLAB | Performed by: NURSE PRACTITIONER

## 2025-01-16 PROCEDURE — 2500000002 HC RX 250 W HCPCS SELF ADMINISTERED DRUGS (ALT 637 FOR MEDICARE OP, ALT 636 FOR OP/ED): Performed by: STUDENT IN AN ORGANIZED HEALTH CARE EDUCATION/TRAINING PROGRAM

## 2025-01-16 RX ADMIN — SODIUM CHLORIDE, SODIUM LACTATE, POTASSIUM CHLORIDE, AND CALCIUM CHLORIDE 100 ML/HR: 600; 310; 30; 20 INJECTION, SOLUTION INTRAVENOUS at 04:51

## 2025-01-16 RX ADMIN — Medication 5 MG: at 20:31

## 2025-01-16 RX ADMIN — METOPROLOL SUCCINATE 37.5 MG: 25 TABLET, EXTENDED RELEASE ORAL at 09:35

## 2025-01-16 RX ADMIN — PANTOPRAZOLE SODIUM 40 MG: 40 INJECTION, POWDER, FOR SOLUTION INTRAVENOUS at 20:31

## 2025-01-16 RX ADMIN — CEFEPIME 1 G: 1 INJECTION, POWDER, FOR SOLUTION INTRAMUSCULAR; INTRAVENOUS at 05:50

## 2025-01-16 RX ADMIN — NYSTATIN 1 APPLICATION: 100000 POWDER TOPICAL at 20:45

## 2025-01-16 RX ADMIN — METRONIDAZOLE 500 MG: 500 INJECTION, SOLUTION INTRAVENOUS at 20:31

## 2025-01-16 RX ADMIN — ROSUVASTATIN CALCIUM 10 MG: 10 TABLET, FILM COATED ORAL at 20:31

## 2025-01-16 RX ADMIN — NYSTATIN 1 APPLICATION: 100000 POWDER TOPICAL at 09:41

## 2025-01-16 RX ADMIN — METRONIDAZOLE 500 MG: 500 INJECTION, SOLUTION INTRAVENOUS at 04:51

## 2025-01-16 RX ADMIN — MIRTAZAPINE 15 MG: 15 TABLET, FILM COATED ORAL at 20:31

## 2025-01-16 RX ADMIN — CEFEPIME 1 G: 1 INJECTION, POWDER, FOR SOLUTION INTRAMUSCULAR; INTRAVENOUS at 16:55

## 2025-01-16 RX ADMIN — METRONIDAZOLE 500 MG: 500 INJECTION, SOLUTION INTRAVENOUS at 12:49

## 2025-01-16 ASSESSMENT — COGNITIVE AND FUNCTIONAL STATUS - GENERAL
STANDING UP FROM CHAIR USING ARMS: A LITTLE
MOVING FROM LYING ON BACK TO SITTING ON SIDE OF FLAT BED WITH BEDRAILS: A LITTLE
HELP NEEDED FOR BATHING: A LITTLE
MOVING TO AND FROM BED TO CHAIR: A LITTLE
PERSONAL GROOMING: A LITTLE
DAILY ACTIVITIY SCORE: 20
TURNING FROM BACK TO SIDE WHILE IN FLAT BAD: A LITTLE
MOVING FROM LYING ON BACK TO SITTING ON SIDE OF FLAT BED WITH BEDRAILS: A LITTLE
WALKING IN HOSPITAL ROOM: A LITTLE
TOILETING: A LITTLE
TOILETING: A LITTLE
HELP NEEDED FOR BATHING: A LITTLE
TOILETING: A LITTLE
STANDING UP FROM CHAIR USING ARMS: A LITTLE
DAILY ACTIVITIY SCORE: 20
DAILY ACTIVITIY SCORE: 20
DRESSING REGULAR LOWER BODY CLOTHING: A LITTLE
DRESSING REGULAR LOWER BODY CLOTHING: A LITTLE
WALKING IN HOSPITAL ROOM: A LITTLE
DRESSING REGULAR UPPER BODY CLOTHING: A LITTLE
MOBILITY SCORE: 18
TURNING FROM BACK TO SIDE WHILE IN FLAT BAD: A LITTLE
HELP NEEDED FOR BATHING: A LITTLE
CLIMB 3 TO 5 STEPS WITH RAILING: A LITTLE
DRESSING REGULAR LOWER BODY CLOTHING: A LITTLE
DRESSING REGULAR UPPER BODY CLOTHING: A LITTLE
MOBILITY SCORE: 18
MOVING TO AND FROM BED TO CHAIR: A LITTLE
CLIMB 3 TO 5 STEPS WITH RAILING: A LITTLE

## 2025-01-16 ASSESSMENT — PAIN - FUNCTIONAL ASSESSMENT
PAIN_FUNCTIONAL_ASSESSMENT: 0-10
PAIN_FUNCTIONAL_ASSESSMENT: 0-10

## 2025-01-16 ASSESSMENT — PAIN SCALES - GENERAL
PAINLEVEL_OUTOF10: 0 - NO PAIN

## 2025-01-16 NOTE — PROGRESS NOTES
"Occupational Therapy    OT Treatment    Patient Name: Mary Burden  MRN: [id number]  Department: [facility]  Room: [address] [place], [place] [address]-A  Today's Date: 1/16/2025  Time Calculation  Start Time: 1316  Stop Time: 1346  Time Calculation (min): 30 min      Assessment:  Pt limited by decreased strength and functional endurance for self care performance. Pt daughter verbalizes concerns about patient returning home and being able to care for herself.   Evaluation/Treatment Tolerance: Patient tolerated treatment well, Patient limited by fatigue (Encouragement for participation)  End of Session Communication: PCT/NA/CTA  End of Session Patient Position: Bed, 3 rail up, Alarm on  OT Assessment Results: Decreased ADL status, Decreased safe judgment during ADL, Decreased endurance, Decreased functional mobility    Plan:  Treatment Interventions: ADL retraining, Functional transfer training, Endurance training  OT Frequency: 2 times per week  OT Discharge Recommendations: Low - moderate intensity level of continued care  OT - OK to Discharge: Yes (when medically stable/cleared)    Subjective   \"Everybody wants to bother me when I am watching my show.\" (soap opera on tv)    Previous Visit Info:  OT Last Visit  OT Received On: 01/16/25  General:  General  Reason for Referral: Decline in self care performance  Referred By: OT/PT Serafin  Past Medical History Relevant to Rehab: HLD,HTN,DM2, GERD, Pulmonary fiborsis, alcohol use disorder.  Family/Caregiver Present: Yes  Caregiver Feedback: supportive daughter  Prior to Session Communication: Care Coordinator  Patient Position Received: Bed, 3 rail up, Alarm off, not on at start of session  General Comment: pt is a 92 yo female  [facility] the [facility] on 1/14 with reports of SOB and weakness.  dcx : community aquired PNA and acute pancreatitis  test/ labs: BNP : 338, CXR :  Right lower lobe infiltrates, small R pleural effusion.  US BLEs neg DVT,  CT angio chest/ CT ABD :  neg PE ,  pulmonary fibrosis, "  acute pancreatitis , cirrhosis of the liver, moderate ascites, CT head neg .  Precautions:  Medical Precautions: Fall precautions, Oxygen therapy device and L/min    Vital Signs (Past 2hrs)        Date/Time Vitals Session Patient Position Pulse Resp SpO2 BP MAP (mmHg)    01/16/25 1316 During OT  --  --  --  98 %  120/78  98                         Pt on 2L O2 via nc     Pain:  Pain Assessment  Pain Assessment: 0-10  0-10 (Numeric) Pain Score: 0 - No pain    Objective    Cognition:  Cognition  Overall Cognitive Status: Within Functional Limits     Functional Standing Tolerance:  Time: <1 min  Activity: Pt declined stand balance activity stating she was too tired.  Functional Standing Tolerance Comments: Pt daughter reports that pt has had general decline in self care task performance at home; after coming home from the hospital in January she was having more difficulty with BADL such as getting dressed. At this time pt reports increased fatigue and need to rest / lack of sleep; encouragement for participation required; pt declined completing ADLs and agreeable to ther ex today.  Bed Mobility/Transfers: Bed Mobility  Bed Mobility: Yes  Bed Mobility 1  Bed Mobility 1: Supine to sitting, Sitting to supine  Level of Assistance 1: Close supervision  Bed Mobility Comments 1: HOB elevated    Transfer 1  Transfer From 1:  (and from)  Technique 1: Sit to stand, Stand to sit  Transfer Level of Assistance 1: Close supervision, Minimal verbal cues  Trials/Comments 1: bed to recliner and recline to bed with SBA without AD and v/c for safety     Therapy/Activity: Therapeutic Exercise  Therapeutic Exercise Performed: Yes  Therapeutic Exercise Activity 1: BUE ther ex for increased functional endurance / strength for ADL task completion; pt required rest breaks between reps due to fatigue; 5 set x 15-20 reps with 2# dowel; seated.     Outcome Measures:[facility] Daily Activity  Putting on and taking off regular lower body clothing: A  little  Bathing (including washing, rinsing, drying): A little  Putting on and taking off regular upper body clothing: None  Toileting, which includes using toilet, bedpan or urinal: A little  Taking care of personal grooming such as brushing teeth: A little  Eating Meals: None  Daily Activity - Total Score: 20    Education Documentation  Functional Transfer Training, taught by Sabrina Carbone OT at 1/16/2025  2:18 PM.  Learner: Patient  Readiness: Acceptance  Method: Explanation, Demonstration  Response: Verbalizes Understanding, Demonstrated Understanding, Needs Reinforcement    Goals:  Encounter Problems       Encounter Problems (Active)       OT Goals       Mod I for all functional transfers  (Progressing)       Start:  01/15/25    Expected End:  01/29/25            Mod I for functional mobility household distances for ADL/IADL particicpation.  (Progressing)       Start:  01/15/25    Expected End:  01/29/25            Fair + dyn standing balance during ADLs and functional activities  (Progressing)       Start:  01/15/25    Expected End:  01/29/25            Independent for LB dressing  (Progressing)       Start:  01/15/25    Expected End:  01/29/25

## 2025-01-16 NOTE — PROGRESS NOTES
Physical Therapy    Physical Therapy    Physical Therapy Treatment    Patient Name: Mary Burden  MRN: [id number]  Today's Date: 1/16/2025  Time Calculation  Start Time: 1019  Stop Time: 1037  Time Calculation (min): 18 min       Assessment/Plan   PT Assessment  PT Assessment Results: Decreased strength, Decreased endurance, Impaired balance, Decreased mobility, Decreased coordination  Rehab Prognosis: Good  Evaluation/Treatment Tolerance: Patient limited by fatigue  Barriers to Participation: Comorbidities  End of Session Communication: Bedside nurse  Assessment Comment: pt with decreased mobility /gait strength balance endurance pt to benefit from skilled PT to address deficits and improve functional mobility  End of Session Patient Position: Bed, 3 rail up     PT Plan  Treatment/Interventions: Bed mobility, Transfer training, Gait training, Stair training, Balance training, Strengthening, Endurance training, Therapeutic exercise, Therapeutic activity  PT Plan: Ongoing PT  PT Frequency: 3 times per week  PT Discharge Recommendations: Low intensity level of continued care  PT Recommended Transfer Status: Assist x1, Assistive device      Current Problem:  1. Community acquired pneumonia, unspecified laterality        2. Hypomagnesemia        3. Atrial fibrillation, unspecified type (Multi)            General Visit Information:   PT  Visit  PT Received On: 01/16/25  General  Reason for Referral: Weakness/ impaired mobility  Patient Position Received: Bed, 3 rail up  Preferred Learning Style: verbal  General Comment:  (Pt laying in bed.  Pt states I just got back into bed.  i am walking around in my room with nursing.  I didn't sleep any last night.  I want to get some sleep.)  Subjective     Precautions:  Precautions  Medical Precautions: Fall precautions    Vital Signs:  Vital Signs  SpO2: 97 % (2L nasal cannula)  Objective     Pain:  Pain Assessment  Pain Assessment: 0-10  0-10 (Numeric) Pain Score: 0 - No  pain                        Treatments:           Bed Mobility  Bed Mobility: Yes  Bed Mobility 1  Bed Mobility 1: Supine to sitting  Level of Assistance 1: Close supervision  Bed Mobility Comments 1:  (Pt transfers onto EOB with supervision managing O2 line.)  Bed Mobility 2  Bed Mobility  2: Sitting to supine  Level of Assistance 2: Close supervision  Bed Mobility Comments 2:  (Pt transfers back into bed wth mod independence.  Pt scoots about and roll over in bed independently.)  Ambulation/Gait Training  Ambulation/Gait Training Performed: Yes  Ambulation/Gait Training 1  Surface 1: Level tile  Device 1: No device  Assistance 1: Contact guard  Quality of Gait 1: Inconsistent stride length, Decreased step length  Comments/Distance (ft) 1:  (Pt ambulates without AD with slow uneven strides with step through gait with CGA chandana 40 ft with assist  managing O2 line without LOB.)  Transfers  Transfer: Yes  Transfer 1  Technique 1: Sit to stand, Stand to sit  Transfer Level of Assistance 1: Close supervision  Trials/Comments 1:  (Pt transfers from bed without AD with supervision.)          Outcome Measures:     Education Documentation  No documentation found.  Education Comments  No comments found.        EDUCATION:     Encounter Problems       Encounter Problems (Active)       PT Problem       Pt will demonstrate mod I  with bed mobility to edge of bed.   (Progressing)       Start:  01/15/25    Expected End:  01/29/25            Pt will demonstrate mod I  with sit to stand/chair transfers with FWW.   (Progressing)       Start:  01/15/25    Expected End:  01/29/25            Pt will ambulate 100 feet with FWW MOD I .   (Progressing)       Start:  01/15/25    Expected End:  01/29/25            Pt to demo 3 steps with  rails with SUP  (Progressing)       Start:  01/15/25    Expected End:  01/29/25               Pain - Adult

## 2025-01-16 NOTE — PROGRESS NOTES
Medical Group Progress Note  ASSESSMENT & PLAN:     Acute pancreatitis  Hemorrhagic pancreatic pseudocyst  - Presenting from home with chief complaints of generalized weakness, shortness of breath however does have abdominal pain and tenderness to deep palpation  - CT abdomen/pelvis with concerns for acute pancreatitis along with hemorrhagic pseudocyst and smaller pseudocyst  - GI consulted  - Continue IV antibiotics at this time to cover for pancreatic necrosis  until further evaluation with cefepime, metronidazole  - LR 100ml/hr  - Watching hemoglobin - 10 today (11.5 on admission)    Alcoholic liver disease with cirrhosis  Splenic venous thrombosis  Ascites  - As seen on imaging  - Unable to drain per ultrasound/radiology  - GI Following  - No need for systemic anticoagulation     Pulmonary fibrosis  - As seen on CTA chest  - Pulmonology following     Alcohol use disorder  - Has not drink any alcohol since her most recent admission at [facility]     Leukocytosis, likely reactive to above     Essential hypertension  Hyperlipidemia  Type II DM  - Resume home medications once reconciled    Severe protein calorie malnutrition  - Appreciate recommendations from registered dietitian    VTE Prophylaxis: SCDs (holding chemoprophylaxis in setting of hemorrhagic pseudocyst)    Disposition/Daily update: Slight drop in hemoglobin today, pain is improving, tolerating full liquid diet.  Attempted to have ascites drained however not enough fluid seen on ultrasound per radiology.  Continue IV fluids per GI.  Will discuss neck step with GI, WBC count trending down continue antibiotics.      ---Of note, this documentation is completed using the Dragon Dictation system (voice recognition software). There may be spelling and/or grammatical errors that were not corrected prior to final submission.---    Brady Betancourt MD    SUBJECTIVE     Patient was seen and examined bedside this morning.  Still having pain.  Did have episodes of  urinary retention overnight and refused straight cath therefore requested her use a commode or toilet this morning.    OBJECTIVE:     Last Recorded Vitals:  Vitals:    01/16/25 0743 01/16/25 0744 01/16/25 1044 01/16/25 1336   BP: 109/66 108/64  120/78   BP Location: Right arm      Patient Position: Lying   Sitting   Pulse: 79   89   Resp: 18   18   Temp: 35.5 °C (95.9 °F)   35.8 °C (96.4 °F)   TempSrc: Temporal      SpO2: 97%  97% 95%   Weight:       Height:         Last I/O:  I/O last 3 completed shifts:  In: 1270.8 (28 mL/kg) [I.V.:50 (1.1 mL/kg); IV Piggyback:1220.8]  Out: 400 (8.8 mL/kg) [Urine:400 (0.2 mL/kg/hr)]  Weight: 45.4 kg     Physical Exam  Vitals reviewed.   Constitutional:       General: She is not in acute distress.     Appearance: Normal appearance. She is not toxic-appearing.   HENT:      Head: Normocephalic and atraumatic.   Eyes:      Extraocular Movements: Extraocular movements intact.      Conjunctiva/sclera: Conjunctivae normal.   Cardiovascular:      Rate and Rhythm: Normal rate and regular rhythm.      Pulses: Normal pulses.      Heart sounds: Normal heart sounds.   Pulmonary:      Effort: Pulmonary effort is normal. No respiratory distress.      Breath sounds: Normal breath sounds. No wheezing.   Abdominal:      General: Bowel sounds are normal. There is no distension.      Palpations: Abdomen is soft.      Tenderness: There is abdominal tenderness. There is no guarding.   Musculoskeletal:         General: Normal range of motion.      Cervical back: Normal range of motion and neck supple.   Neurological:      General: No focal deficit present.      Mental Status: She is alert and oriented to person, place, and time. Mental status is at baseline.   Psychiatric:         Mood and Affect: Mood normal.         Behavior: Behavior normal.         Thought Content: Thought content normal.     Inpatient Medications:  cefepime, 1 g, intravenous, q12h  metoprolol succinate XL, 37.5 mg, oral,  Daily  metroNIDAZOLE, 500 mg, intravenous, q8h  mirtazapine, 15 mg, oral, Nightly  nystatin, 1 Application, Topical, BID  pantoprazole, 40 mg, oral, BID   Or  pantoprazole, 40 mg, intravenous, BID  rosuvastatin, 10 mg, oral, Nightly    PRN Medications  PRN medications: acetaminophen **OR** acetaminophen **OR** acetaminophen, calcium carbonate, melatonin, polyethylene glycol  Continuous Medications:  lactated Ringer's, 100 mL/hr, Last Rate: 100 mL/hr (01/16/25 1118)      LABS AND IMAGING:     Labs:  Results from last 7 days   Lab Units 01/16/25  0526 01/15/25  0444 01/14/25  1425   WBC AUTO x10*3/uL 15.1* 22.1* 22.4*   RBC AUTO x10*6/uL 3.32* 3.68* 3.83*   HEMOGLOBIN g/dL 10.0* 11.2* 11.5*   HEMATOCRIT % 29.5* 33.4* 35.0*   MCV fL 89 91 91   MCH pg 30.1 30.4 30.0   MCHC g/dL 33.9 33.5 32.9   RDW % 13.7 14.0 13.9   PLATELETS AUTO x10*3/uL 238 223 300     Results from last 7 days   Lab Units 01/16/25  0526 01/15/25  0444 01/14/25  1425   SODIUM mmol/L 136 136 138   POTASSIUM mmol/L 3.6 3.9 4.0   CHLORIDE mmol/L 104 104 105   CO2 mmol/L 24 21 23   BUN mg/dL 26* 22 22   CREATININE mg/dL 0.74 0.74 0.78   GLUCOSE mg/dL 129* 87 87   PROTEIN TOTAL g/dL  --  5.5* 5.6*   CALCIUM mg/dL 7.7* 8.1* 8.1*   BILIRUBIN TOTAL mg/dL  --  1.5* 1.5*   ALK PHOS U/L  --  33 35   AST U/L  --  18 17   ALT U/L  --  6* 8     Results from last 7 days   Lab Units 01/16/25  0526 01/15/25  0444 01/14/25  1425   MAGNESIUM mg/dL 1.87 1.97 1.41*     Results from last 7 days   Lab Units 01/14/25  1620 01/14/25  1425   TROPHS ng/L 12 10     Imaging:  ECG 12 lead  Sinus rhythm with Blocked Premature atrial complexes  Septal infarct , age undetermined  Abnormal ECG  When compared with ECG of 14-JAN-2025 14:14, (unconfirmed)  Sinus rhythm has replaced Atrial fibrillation  Nonspecific T wave abnormality has replaced inverted T waves in Lateral leads  US abdomen limited  Narrative: Interpreted By:  Avinash Hidalgo,   STUDY:  US ABDOMEN LIMITED;  1/16/2025  11:14 am      INDICATION:  Signs/Symptoms:paracentesis. Abdominal distension.          COMPARISON:  CT abdomen/pelvis of 01/14/2025.      ACCESSION NUMBER(S):  [id number]      ORDERING CLINICIAN:  DELIA VELIZ      TECHNIQUE:  Sonographic 4 quadrant ascites survey was performed for fluid  evaluation and potential paracentesis planning.      FINDINGS:  Trace ascites is demonstrated throughout the 4 quadrants of the  abdomen. Volume of fluid is insufficient for paracentesis at this  time. No procedure was performed.      Impression: Trace ascites. Volume of fluid is insufficient for paracentesis at  this time.      MACRO:  None.      Signed by: Avinash Hidalgo 1/16/2025 11:17 AM  Dictation workstation:   [id number]

## 2025-01-16 NOTE — PROGRESS NOTES
Mary Burden is a 93 y.o. female on day 2 of admission presenting with Community acquired pneumonia, unspecified laterality.    Subjective   Acute pancreatitis     GI following for acute pancreatitis. P/w generalized weakness, shortness of breath, upper abdominal pain.  CT abd/pelvis noting acute pancreatitis along with hemorrhagic pseudocyst and smaller pseudocyst.  Patient doing well.  No acute events overnight.  Still having abdominal bloating and discomfort.  No nausea, vomiting, diarrhea, constipation or black or bloody stools.  Hgb 10, WBC 15.  Normal platelets.  INR 1.6.  Total bilirubin 1.5.    Objective   Constitutional:       General: She is not in acute distress.     Appearance: Normal appearance. She is not toxic-appearing.   HENT:      Head: Normocephalic and atraumatic.      Nose: Nose normal.      Mouth/Throat:      Mouth: Mucous membranes are moist.      Pharynx: Oropharynx is clear.      Comments: No upper teeth, lower teeth missing and have cavities/decay.  Eyes:      Extraocular Movements: Extraocular movements intact.      Conjunctiva/sclera: Conjunctivae normal.      Pupils: Pupils are equal, round, and reactive to light.   Cardiovascular:      Rate and Rhythm: Normal rate and regular rhythm.      Pulses: Normal pulses.      Heart sounds: Normal heart sounds.   Pulmonary:      Effort: Pulmonary effort is normal. No respiratory distress.      Breath sounds: No wheezing.      Comments: No acute respiratory distress.  Breath sounds diminished to auscultation bilaterally.  Abdominal:      General: Bowel sounds are normal. There is no distension.      Palpations: Abdomen is soft.      Tenderness: There is abdominal tenderness. There is no guarding.      Comments: Abdomen soft, bowel sounds are present.  Diffusely tender to deep palpation.  No rebound or guarding.   Musculoskeletal:         General: Normal range of motion.      Cervical back: Normal range of motion and neck supple.   Neurological:  "     General: No focal deficit present.      Mental Status: She is alert and oriented to person, place, and time. Mental status is at baseline.   Psychiatric:         Mood and Affect: Mood normal.         Behavior: Behavior normal.         Thought Content: Thought content normal.        Last Recorded Vitals  Blood pressure 108/64, pulse 79, temperature 35.5 °C (95.9 °F), temperature source Temporal, resp. rate 18, height 1.6 m (5' 3\"), weight 45.4 kg (100 lb), SpO2 97%.  Intake/Output last 3 Shifts:  I/O last 3 completed shifts:  In: 1270.8 (28 mL/kg) [I.V.:50 (1.1 mL/kg); IV Piggyback:1220.8]  Out: 400 (8.8 mL/kg) [Urine:400 (0.2 mL/kg/hr)]  Weight: 45.4 kg     Relevant Results    CT angio chest for pulmonary embolism    Result Date: 1/14/2025  STUDY: CT Angiogram of the Chest, CT Abdomen and Pelvis with IV Contrast; 01/14/2025 at 4:12 PM INDICATION: Shortness of breath. Leg swelling. Lower abdominal pain. COMPARISON: XR chest 01/14/25. ACCESSION NUMBER(S): [id number], [id number] ORDERING CLINICIAN: MARK VOGEL TECHNIQUE:  CTA of the chest was performed following rapid injection of intravenous contrast.  Images are reviewed and processed at a workstation according to the CT angiogram protocol with 3-D and/or MIP post processing imaging generated.  CT of the abdomen and pelvis was performed with intravenous contrast.  Omnipaque-350 75 mL was administered intravenously; positive oral contrast was given. Automated mA/kV exposure control was utilized and patient examination was performed in strict accordance with principles of ALARA. FINDINGS:  No filling defects are seen within the main pulmonary artery, left or right pulmonary arteries, or first order branches to indicate an embolus.  No early filling veins are visualized indicate an arteriovenous malformation.  Emphysematous changes are seen within the lungs bilaterally.  There are peripheral interstitial markings bilaterally, which may indicate a chronic " process such as fibrosis. There is a tiny right pleural effusion.  No enlarged lymph nodes are seen in the mediastinal or hilar regions.  There is no pericardial effusion.  Coronary artery calcifications are present.  There is aneurysmal dilatation of the ascending aorta at 4.3 x 4.0 cm.  The aortic arch is measured at 3.4 cm.  The proximal descending thoracic aorta is measured at 3.2 cm.  The distal descending thoracic aorta is measured at 2.6 cm.  Degenerative changes are seen throughout the thoracic spine.  There is a nodular contour of the liver, indicative of cirrhosis. There appears be partial cavernous transformation of the portal vein. The hepatic veins appear patent.  Gallstones are present.  There is moderate distention of the gallbladder.  The spleen is not enlarged. There appears to be chronic thrombosis of the splenic vein.  There are inflammatory changes surrounding the pancreas, indicative of acute hepatitis.  There are multiple fluid collections are visualized, indicative of cirrhosis.  There appears be a hemorrhagic pseudocyst posterior to the head measured at 5.2 x 4.4 x 9.9 cm(Series 602, Image 58; Series 601, Image 53).  The hemorrhagic component is seen in the superior aspect of the pseudocyst is estimated 1.8 x 2.0 cm.  A smaller pseudocyst is seen anterior to the pancreatic head measured at 1.5 x 1.6 cm(Series 601, Image 49).  No adrenal nodule is noted.  No acute findings are seen within either kidney.  There is a mild to moderate amount of abdominal ascites.  No dilated loops of small bowel or colon are visually.  There are diverticula seen throughout the colon.  There is no evidence of diverticulitis.  The appendix is unremarkable in appearance.  No enlarged lymph nodes are seen in the pelvic sidewalls, iliac chains, or retroperitoneum.  There is aneurysmal dilatation of the abdominal aorta at 3.2 cm.  No prominent edema is seen within the psoas musculature.  There is generalized anasarca.   Degenerative changes are seen throughout the lumbar spine.    CHEST: 1.  No evidence of a pulmonary embolus. 2.  Emphysematous changes. 3.  Peripheral interstitial markings, most likely a chronic process such as fibrosis. 4.  Tiny right pleural effusion. 5.  Coronary artery calcifications. 6.  Aneurysmal dilatation of the thoracic aorta. ABDOMEN/PELVIS: 1.  Acute pancreatitis, with development of pseudocysts off of the pancreatic head.  The larger pseudocyst displays a small focus of internal hemorrhage.  Correlation with serum CBC is recommended. 2.  Cirrhotic morphology of the liver, with cavernous transformation of the portal vein. 3.  Chronic thrombosis of the splenic vein. 4.  Cholelithiasis. 5.  Mild to moderate abdominal ascites. 6.  Anasarca. 7.  Aneurysmal dilatation of the abdominal aorta. 8.  Colonic diverticulosis, no evidence of diverticulitis. This report was called to Dr. Mark Vogel at 4:55 PM on January 14, 2025. Signed by Jonathan Farias MD    CT abdomen pelvis w IV contrast    Result Date: 1/14/2025  STUDY: CT Angiogram of the Chest, CT Abdomen and Pelvis with IV Contrast; 01/14/2025 at 4:12 PM INDICATION: Shortness of breath. Leg swelling. Lower abdominal pain. COMPARISON: XR chest 01/14/25. ACCESSION NUMBER(S): [id number], [id number] ORDERING CLINICIAN: MARK VOGEL TECHNIQUE:  CTA of the chest was performed following rapid injection of intravenous contrast.  Images are reviewed and processed at a workstation according to the CT angiogram protocol with 3-D and/or MIP post processing imaging generated.  CT of the abdomen and pelvis was performed with intravenous contrast.  Omnipaque-350 75 mL was administered intravenously; positive oral contrast was given. Automated mA/kV exposure control was utilized and patient examination was performed in strict accordance with principles of ALARA. FINDINGS:  No filling defects are seen within the main pulmonary artery, left or right pulmonary  arteries, or first order branches to indicate an embolus.  No early filling veins are visualized indicate an arteriovenous malformation.  Emphysematous changes are seen within the lungs bilaterally.  There are peripheral interstitial markings bilaterally, which may indicate a chronic process such as fibrosis. There is a tiny right pleural effusion.  No enlarged lymph nodes are seen in the mediastinal or hilar regions.  There is no pericardial effusion.  Coronary artery calcifications are present.  There is aneurysmal dilatation of the ascending aorta at 4.3 x 4.0 cm.  The aortic arch is measured at 3.4 cm.  The proximal descending thoracic aorta is measured at 3.2 cm.  The distal descending thoracic aorta is measured at 2.6 cm.  Degenerative changes are seen throughout the thoracic spine.  There is a nodular contour of the liver, indicative of cirrhosis. There appears be partial cavernous transformation of the portal vein. The hepatic veins appear patent.  Gallstones are present.  There is moderate distention of the gallbladder.  The spleen is not enlarged. There appears to be chronic thrombosis of the splenic vein.  There are inflammatory changes surrounding the pancreas, indicative of acute hepatitis.  There are multiple fluid collections are visualized, indicative of cirrhosis.  There appears be a hemorrhagic pseudocyst posterior to the head measured at 5.2 x 4.4 x 9.9 cm(Series 602, Image 58; Series 601, Image 53).  The hemorrhagic component is seen in the superior aspect of the pseudocyst is estimated 1.8 x 2.0 cm.  A smaller pseudocyst is seen anterior to the pancreatic head measured at 1.5 x 1.6 cm(Series 601, Image 49).  No adrenal nodule is noted.  No acute findings are seen within either kidney.  There is a mild to moderate amount of abdominal ascites.  No dilated loops of small bowel or colon are visually.  There are diverticula seen throughout the colon.  There is no evidence of diverticulitis.  The  appendix is unremarkable in appearance.  No enlarged lymph nodes are seen in the pelvic sidewalls, iliac chains, or retroperitoneum.  There is aneurysmal dilatation of the abdominal aorta at 3.2 cm.  No prominent edema is seen within the psoas musculature.  There is generalized anasarca.  Degenerative changes are seen throughout the lumbar spine.    CHEST: 1.  No evidence of a pulmonary embolus. 2.  Emphysematous changes. 3.  Peripheral interstitial markings, most likely a chronic process such as fibrosis. 4.  Tiny right pleural effusion. 5.  Coronary artery calcifications. 6.  Aneurysmal dilatation of the thoracic aorta. ABDOMEN/PELVIS: 1.  Acute pancreatitis, with development of pseudocysts off of the pancreatic head.  The larger pseudocyst displays a small focus of internal hemorrhage.  Correlation with serum CBC is recommended. 2.  Cirrhotic morphology of the liver, with cavernous transformation of the portal vein. 3.  Chronic thrombosis of the splenic vein. 4.  Cholelithiasis. 5.  Mild to moderate abdominal ascites. 6.  Anasarca. 7.  Aneurysmal dilatation of the abdominal aorta. 8.  Colonic diverticulosis, no evidence of diverticulitis. This report was called to Dr. Ollie Bernstein at 4:55 PM on January 14, 2025. Signed by Jonathan Farias MD    CT abdomen pelvis w IV contrast    Result Date: 12/29/2024  * * *Final Report* * * DATE OF EXAM: Dec 28 2024 11:17PM   [facility]   0530  -  CT ABD/PEL W IVCON  / ACCESSION #  [id number] PROCEDURE REASON: Abdominal pain, acute, nonlocalized      * * * * Physician Interpretation * * * *  EXAMINATION:  CT ABD/PEL W IVCON INDICATION: Abdominal pain, acute, nonlocalized Abdominal pain, acute, nonlocalized COMPARISON: CT chest 10/06/2021. TECHNIQUE: CT of the abdomen and pelvis was performed using standard technique, scanning from just above the dome of the diaphragm to the pubic symphysis. Contrast: IV:  75 ml of Omnipaque 350 : ml of CT Radiation dose: Integrated Dose-length product  (DLP) for this visit =   221 mGy*cm. CT Dose Reduction Employed: Automated exposure control(AEC) and iterative recon FINDINGS: Lower Thorax: Fibrotic changes noted.  Dilated ascending aorta, incompletely assessed. Liver:  No mass. Gallbladder/Biliary: Cholelithiasis.  No bile duct dilation. Spleen: Unremarkable. Pancreas: Heterogeneous enhancement with areas of relative low attenuation in the proximal body, neck and head.  No main duct dilation.   Tracking peripancreatic fluid. Adrenals: No mass. Kidneys: No suspicious mass.  No hydronephrosis. Vessels: -Severe narrowing of the central superior mesenteric vein. -Atherosclerotic disease.  Irregular noncalcified plaque of the descending thoracic aorta, similar to prior chest CT.   Infrarenal abdominal aortic aneurysm, 3.4 cm.  Right common iliac artery ectasia, 1.7 cm. GI Tract: Diverticulosis.  No wall thickening or obstruction.  Normal appendix. Pelvis: No pelvic mass. Mesentery/Peritoneum/Retroperitoneum: See above.  Small ascites. Lymph Nodes: No lymphadenopathy. Bones, soft tissues: No suspicious bone lesions.  No acute findings.  (topogram) images: No additional findings.    IMPRESSION: 1.  Acute pancreatitis. -Relative areas of low attenuation; suggest short-term follow-up CT pancreas protocol to better assess for developing parenchymal necrosis. -Severe central SMV narrowing. 2.  Infrarenal abdominal aortic aneurysm (3.4 cm). : [facility]   Transcribe Date/Time: Dec 29 2024 12:19A Dictated by : GAMAL THURMAN MD This examination was interpreted and the report reviewed and electronically signed by: GAMAL THURMAN MD on Dec 29 2024 12:43AM  EST    US gallbladder    Result Date: 12/29/2024  * * *Final Report* * * DATE OF EXAM: Dec 28 2024 11:40PM   [facility]   1032  -  US ABD RIGHT UPPER QUADRANT  / ACCESSION #  [id number] PROCEDURE REASON: Abn liver function tests (LFTs)      * * * * Physician Interpretation * * * *  EXAM: US ABD RIGHT UPPER QUADRANT  DATE: 12/28/2024 11:40 PM INDICATION: , Abn liver function tests (LFTs) COMPARISON: Same day CT TECHNIQUE: Transverse and longitudinal gray scale and color doppler sonographic images of the right upper abdomen were obtained. FINDINGS: LIVER Normal echogenicity. Normal contour. No masses. GALLBLADDER Cholelithiasis and sludge.  No wall thickening or pericholecystic fluid. Negative sonographic Lopez's sign. BILE DUCTS No intra hepatic biliary dilation. Common bile duct measures 0.5 cm, normal. PANCREAS: Better assessed on CT. RIGHT KIDNEY: Echogenicity: Normal Collecting System:  No hydronephrosis Stones:  None Cyst/Mass: None FREE FLUID: None OTHER: Infrarenal abdominal aortic aneurysm, better assessed on CT.    IMPRESSION: 1.  Cholelithiasis and sludge.  No evidence of acute cholecystitis. : [facility]   Transcribe Date/Time: Dec 29 2024 12:19A Dictated by : GAMAL THURMAN MD This examination was interpreted and the report reviewed and electronically signed by: GAMAL THURMAN MD on Dec 29 2024 12:22AM  EST    Lab Results   Component Value Date    WBC 15.1 (H) 01/16/2025    HGB 10.0 (L) 01/16/2025    HCT 29.5 (L) 01/16/2025    MCV 89 01/16/2025     01/16/2025     Lab Results   Component Value Date    ALT 6 (L) 01/15/2025    AST 18 01/15/2025    ALKPHOS 33 01/15/2025    BILITOT 1.5 (H) 01/15/2025     Lab Results   Component Value Date    GLUCOSE 129 (H) 01/16/2025    CALCIUM 7.7 (L) 01/16/2025     01/16/2025    K 3.6 01/16/2025    CO2 24 01/16/2025     01/16/2025    BUN 26 (H) 01/16/2025    CREATININE 0.74 01/16/2025     Lab Results   Component Value Date    INR 1.6 (H) 01/14/2025    PROTIME 17.7 (H) 01/14/2025       Assessment/Plan   Acute pancreatitis with development of moderate to large size not walled off pseudocysts.    Cirrhosis with large amount of ascites  Alcohol abuse  Chronic thrombus of splenic vein     -If patient is hemodynamically stable and does not have signs of active  GIB, recommend diagnostic and therapeutic paracentesis. Please check cytology, cell count, culture, albumin, protein, LDH, amylase on ascitic fluid. We need to determine if the ascites is coming from the liver vs pancreas  -Repeat imaging CT pancreas protocol in 4 weeks  -For now, we recommend conservative treatment for acute pancreatitis.  -Continue to monitor H&H and transfuse PRBC for Hgb <7.0  -Monitor stool color and consistency and document  -Continue IV antibiotics at this time to cover for pancreatic necrosis   -Recommend PPI 40 twice daily  -Serial abdominal exam  -If Hgb drops significantly or patient has hemodynamic instability, consider stat CT pancreas to evaluate for hemorrhagic pseudocyst.  -MiraLAX 1-2 times daily  -Clear liquid diet  -Trend CMP, CBC  -Recommend IV fluids at 150 cc/h if okay with medicine  -Triglycerides normal 68, IgG4 normal.  autoimmune markers collected, results pending  -HSV 1, HSV-2, VZV not detected.  Toxicology screen negative.  -Clear liquid diet, advance to full liquid diet as tolerated    I spent 30 minutes in the professional and overall care of this patient.      Geneva Newton, APRN-CNP

## 2025-01-16 NOTE — PROGRESS NOTES
Mary Burden is a 93 y.o. female on day 2 of admission presenting with Community acquired pneumonia, unspecified laterality.    SUBJECTIVE     Patient was seen and examined bedside this morning. Still having pain. Did have episodes of urinary retention overnight and refused straight cath therefore requested her use a commode or toilet this morning.     OBJECTIVE:     Last Recorded Vitals: Vitals Vitals: 01/16/25 0743 01/16/25 0744 01/16/25 1044 01/16/25 1336 BP: 109/66 108/64 120/78 BP Location: Right arm     Patient Position: Lying Sitting Pulse: 79 89 Resp: 18 18 Temp: 35.5 °C (95.9 °F) 35.8 °C (96.4 °F) TempSrc: Temporal     SpO2: 97% 97% 95% Weight:     Height:      Last I/O: I/O last 3 completed shifts: In: 1270.8 (28 mL/kg) [I.V.:50 (1.1 mL/kg); IV Piggyback:1220.8] Out: 400 (8.8 mL/kg) [Urine:400 (0.2 mL/kg/hr)] Weight: 45.4 kg      Physical Exam Vitals reviewed. Constitutional:      General: She is not in acute distress. Appearance: Normal appearance. She is not toxic-appearing. HENT: Head: Normocephalic and atraumatic. Eyes: Extraocular Movements: Extraocular movements intact. Conjunctiva/sclera: Conjunctivae normal. Cardiovascular: Rate and Rhythm: Normal rate and regular rhythm. Pulses: Normal pulses. Heart sounds: Normal heart sounds. Pulmonary: Effort: Pulmonary effort is normal. No respiratory distress. Breath sounds: Normal breath sounds. No wheezing. Abdominal: General: Bowel sounds are normal. There is no distension. Palpations: Abdomen is soft. Tenderness: There is abdominal tenderness. There is no guarding. Musculoskeletal:      General: Normal range of motion. Cervical back: Normal range of motion and neck supple. Neurological: General: No focal deficit present. Mental Status: She is alert and oriented to person, place, and time. Mental status is at baseline. Psychiatric:      Mood and Affect: Mood normal.      Behavior: Behavior normal.      Thought Content: Thought content normal.       Inpatient Medications:     Scheduled Medications cefepime, 1 g, intravenous, q12h metoprolol succinate XL, 37.5 mg, oral, Daily metroNIDAZOLE, 500 mg, intravenous, q8h mirtazapine, 15 mg, oral, Nightly nystatin, 1 Application, Topical, BID pantoprazole, 40 mg, oral, BID Or pantoprazole, 40 mg, intravenous, BID rosuvastatin, 10 mg, oral, Nightly     PRN Medications PRN Medications PRN medications: acetaminophen OR acetaminophen OR acetaminophen, calcium carbonate, melatonin, polyethylene glycol     Continuous Medications:     Continuous Medications lactated Ringer's, 100 mL/hr, Last Rate: 100 mL/hr (01/16/25 1118)     LABS AND IMAGING:     Labs: Results from last 7 days Lab Units 01/16/25 0526 01/15/25 0444 01/14/25 1425 WBC AUTO x103/uL 15.1 22.1* 22.4* RBC AUTO x106/uL 3.32 3.68* 3.83* HEMOGLOBIN g/dL 10.0* 11.2* 11.5* HEMATOCRIT % 29.5* 33.4* 35.0* MCV fL 89 91 91 MCH pg 30.1 30.4 30.0 MCHC g/dL 33.9 33.5 32.9 RDW % 13.7 14.0 13.9 PLATELETS AUTO x10*3/uL 238 223 300     Results from last 7 days Lab Units 01/16/25 0526 01/15/25 0444 01/14/25 1425 SODIUM mmol/L 136 136 138 POTASSIUM mmol/L 3.6 3.9 4.0 CHLORIDE mmol/L 104 104 105 CO2 mmol/L 24 21 23 BUN mg/dL 26* 22 22 CREATININE mg/dL 0.74 0.74 0.78 GLUCOSE mg/dL 129* 87 87 PROTEIN TOTAL g/dL -- 5.5* 5.6* CALCIUM mg/dL 7.7* 8.1* 8.1* BILIRUBIN TOTAL mg/dL -- 1.5* 1.5* ALK PHOS U/L -- 33 35 AST U/L -- 18 17 ALT U/L -- 6* 8     Results from last 7 days Lab Units 01/16/25 0526 01/15/25 0444 01/14/25 1425 MAGNESIUM mg/dL 1.87 1.97 1.41*     Results from last 7 days Lab Units 01/14/25 1620 01/14/25 1425 TROPHS ng/L 12 10     Imaging: ECG 12 lead Sinus rhythm with Blocked Premature atrial complexes Septal infarct , age undetermined Abnormal ECG When compared with ECG of 14-JAN-2025 14:14, (unconfirmed) Sinus rhythm has replaced Atrial fibrillation Nonspecific T wave abnormality has replaced inverted T waves in Lateral leads US abdomen limited Narrative: Interpreted  By: Avinash Hidalgo, STUDY: US ABDOMEN LIMITED; 1/16/2025 11:14 am     INDICATION: Signs/Symptoms:paracentesis. Abdominal distension.     COMPARISON: CT abdomen/pelvis of 01/14/2025.     ACCESSION NUMBER(S): [id number]     ORDERING CLINICIAN: DELIA VELIZ     TECHNIQUE: Sonographic 4 quadrant ascites survey was performed for fluid evaluation and potential paracentesis planning.     FINDINGS: Trace ascites is demonstrated throughout the 4 quadrants of the abdomen. Volume of fluid is insufficient for paracentesis at this time. No procedure was performed.     Impression: Trace ascites. Volume of fluid is insufficient for paracentesis at this time.     ASSESSMENT & PLAN:     Acute pancreatitis Hemorrhagic pancreatic pseudocyst     Presenting from home with chief complaints of generalized weakness, shortness of breath however does have abdominal pain and tenderness to deep palpation     CT abdomen/pelvis with concerns for acute pancreatitis along with hemorrhagic pseudocyst and smaller pseudocyst     GI consulted     Continue IV antibiotics at this time to cover for pancreatic necrosis until further evaluation with cefepime, metronidazole     LR 100ml/hr     Watching hemoglobin - 10 today (11.5 on admission)     Ascites     As seen on imaging     Unable to drain per ultrasound/radiology     Pulmonary fibrosis     As seen on CTA chestAlcohol use disorder     Has not drink any alcohol since her most recent admission at [facility]     Leukocytosis, likely reactive to above     Essential hypertension Hyperlipidemia Type II DM     Resume home medications once reconciled     Severe protein calorie malnutrition     Appreciate recommendations from registered dietitian     VTE Prophylaxis: SCDs (holding chemoprophylaxis in setting of hemorrhagic pseudocyst)     Disposition/Daily update: Slight drop in hemoglobin today, pain is improving, tolerating full liquid diet. Attempted to have ascites drained however not enough fluid seen  on ultrasound per radiology. Continue IV fluids per GI. Will discuss neck step with GI, WBC count trending down continue antibiotics.     Pulmonary comment:-Patient has questionable pulmonary fibrosis which need a outpatient follow-up with CT scan as recent CT of the chest will had a lot of motion artifacts.  Patient does not have pneumonia and antibiotics being used mainly for pancreatic pseudocyst.  Patient does however appears to be hypoxic and is appropriately on supplemental oxygen.  She will need home O2 evaluation at discharge.  Plan of care discussed extensively with patient's daughters at the bedside.  I shall continue to monitor this patient with you, and I thank you for the referral.     I spent 25 minutes in the professional and overall care of this patient.      Tyler Stoner MD

## 2025-01-16 NOTE — CARE PLAN
The patient's goals for the shift include  Increase PO intake during meal time    The clinical goals for the shift include pt will remain free from injury    Over the shift, the patient did not make progress toward the following goals. Barriers to progression include decreased appetite . Recommendations to address these barriers include altering meal items to patient's liking  Problem: Fall/Injury  Goal: Not fall by end of shift  Outcome: Progressing  Goal: Be free from injury by end of the shift  Outcome: Progressing  Goal: Verbalize understanding of personal risk factors for fall in the hospital  Outcome: Progressing     Problem: Pain - Adult  Goal: Verbalizes/displays adequate comfort level or baseline comfort level  Outcome: Progressing     Problem: Safety - Adult  Goal: Free from fall injury  Outcome: Progressing     Problem: Discharge Planning  Goal: Discharge to home or other facility with appropriate resources  Outcome: Progressing     Problem: Chronic Conditions and Co-morbidities  Goal: Patient's chronic conditions and co-morbidity symptoms are monitored and maintained or improved  Outcome: Progressing     Problem: Skin  Goal: Decreased wound size/increased tissue granulation at next dressing change  Outcome: Progressing  Goal: Participates in plan/prevention/treatment measures  Outcome: Progressing  Goal: Prevent/manage excess moisture  Outcome: Progressing  Goal: Prevent/minimize sheer/friction injuries  Outcome: Progressing  Goal: Promote/optimize nutrition  Outcome: Progressing  Goal: Promote skin healing  Outcome: Progressing     Problem: Nutrition  Goal: Oral intake greater 75%  Outcome: Progressing  Goal: Consume prescribed supplement  Outcome: Progressing  Goal: Adequate PO fluid intake  Outcome: Progressing  Goal: BG  mg/dL  Outcome: Progressing  Goal: Lab values WNL  Outcome: Progressing  Goal: Electrolytes WNL  Outcome: Progressing  Goal: Gradual weight gain  Outcome: Progressing   .

## 2025-01-16 NOTE — NURSING NOTE
"Patient bladder scanned for 468. Order states to st cath over 400. This RN gathered supplies to st cath patient. Patient was educated on procedure. Patient refused stating \" No. No. No. You are absolutely not doing that it hurts too much. No way\". This RN attempted to educate patient on importance of st cath. Patient refused education. Dr. Cedillo updated.   "

## 2025-01-16 NOTE — PROGRESS NOTES
Met with pt. And dtr. To discuss discharge planning needs.  Pt. Is a/o x 3, pleaseant, lives alone, one fl. Home, uses cane, independnet with care  needs, doing simple meals and housekeeping.  Dtr. Assists with driving, shopping, appts.  Current [facility] scores are 16/20. Snf vs. Homecare discussed, list of snf facilities provided.  Pt. States she has not decided on plan yet ;but if snf she is requesting [facility], ins. Precert process explained.  She is ok with us proceeding with [facility] and when ready to discharge she may decide on return home or to dtr's home with homecare.  Referral sent to [facility], awaiting response.  Pt. With [employer] medicare, will require ins. Precert.    Update:  [facility] able to accept pt., hospital ins. Team to obtain precert.

## 2025-01-16 NOTE — CARE PLAN
Problem: Fall/Injury  Goal: Not fall by end of shift  1/15/2025 2131 by Sara Heredia RN  Outcome: Progressing  1/15/2025 2131 by Sara Heredia RN  Outcome: Progressing  Goal: Be free from injury by end of the shift  1/15/2025 2131 by Sara Heredia RN  Outcome: Progressing  1/15/2025 2131 by Sara Heredia RN  Outcome: Progressing  Goal: Verbalize understanding of personal risk factors for fall in the hospital  1/15/2025 2131 by Sara Heredia RN  Outcome: Progressing  1/15/2025 2131 by Sara Heredia RN  Outcome: Progressing     Problem: Pain - Adult  Goal: Verbalizes/displays adequate comfort level or baseline comfort level  1/15/2025 2131 by Sara Heredia RN  Outcome: Progressing  1/15/2025 2131 by Sara Heredia RN  Outcome: Progressing     Problem: Safety - Adult  Goal: Free from fall injury  1/15/2025 2131 by Sara Heredia RN  Outcome: Progressing  1/15/2025 2131 by Sara Heredia RN  Outcome: Progressing     Problem: Discharge Planning  Goal: Discharge to home or other facility with appropriate resources  1/15/2025 2131 by Sara Heredia RN  Outcome: Progressing  1/15/2025 2131 by Sara Heredia RN  Outcome: Progressing     Problem: Chronic Conditions and Co-morbidities  Goal: Patient's chronic conditions and co-morbidity symptoms are monitored and maintained or improved  1/15/2025 2131 by Sara Heredia RN  Outcome: Progressing  1/15/2025 2131 by Sara Heredia RN  Outcome: Progressing     Problem: Skin  Goal: Decreased wound size/increased tissue granulation at next dressing change  1/15/2025 2131 by Sara Heredia RN  Outcome: Progressing  1/15/2025 2131 by Sara Heredia RN  Outcome: Progressing  Goal: Participates in plan/prevention/treatment measures  1/15/2025 2131 by Sara Heredia RN  Outcome: Progressing  1/15/2025 2131 by Sara Heredia RN  Outcome: Progressing  Goal: Prevent/manage excess moisture  1/15/2025 2131 by  Sara Heredia RN  Outcome: Progressing  1/15/2025 2131 by Sara Heredia RN  Outcome: Progressing  Goal: Prevent/minimize sheer/friction injuries  1/15/2025 2131 by Sara Heredia RN  Outcome: Progressing  1/15/2025 2131 by Sara Heredia RN  Outcome: Progressing  Goal: Promote/optimize nutrition  1/15/2025 2131 by Sara Heredia RN  Outcome: Progressing  1/15/2025 2131 by Sara Heredia RN  Outcome: Progressing  Goal: Promote skin healing  1/15/2025 2131 by Sara Heredia RN  Outcome: Progressing  1/15/2025 2131 by Sara Heredia RN  Outcome: Progressing     Problem: Nutrition  Goal: Oral intake greater 75%  1/15/2025 2131 by Sara Heredia RN  Outcome: Progressing  1/15/2025 2131 by Sara Heredia RN  Outcome: Progressing  Goal: Consume prescribed supplement  1/15/2025 2131 by Sara Heredia RN  Outcome: Progressing  1/15/2025 2131 by Sara Heredia RN  Outcome: Progressing  Goal: Adequate PO fluid intake  1/15/2025 2131 by Sara Heredia RN  Outcome: Progressing  1/15/2025 2131 by Sara Heredia RN  Outcome: Progressing  Goal: BG  mg/dL  Outcome: Progressing  Goal: Lab values WNL  Outcome: Progressing  Goal: Electrolytes WNL  Outcome: Progressing  Goal: Gradual weight gain  Outcome: Progressing   The patient's goals for the shift include rest     The clinical goals for the shift include Patient will remain free from injury throughout shift

## 2025-01-17 ENCOUNTER — HOME HEALTH ADMISSION (OUTPATIENT)
Dept: HOME HEALTH SERVICES | Facility: HOME HEALTH | Age: OVER 89
End: 2025-01-17
Payer: MEDICARE

## 2025-01-17 ENCOUNTER — DOCUMENTATION (OUTPATIENT)
Dept: HOME HEALTH SERVICES | Facility: HOME HEALTH | Age: OVER 89
End: 2025-01-17
Payer: MEDICARE

## 2025-01-17 VITALS
HEART RATE: 92 BPM | RESPIRATION RATE: 18 BRPM | BODY MASS INDEX: 17.72 KG/M2 | WEIGHT: 100 LBS | TEMPERATURE: 97 F | SYSTOLIC BLOOD PRESSURE: 100 MMHG | HEIGHT: 63 IN | DIASTOLIC BLOOD PRESSURE: 58 MMHG | OXYGEN SATURATION: 97 %

## 2025-01-17 LAB
ANA PATTERN: ABNORMAL
ANA SER QL HEP2 SUBST: POSITIVE
ANA TITR SER IF: ABNORMAL {TITER}
ANION GAP SERPL CALC-SCNC: 11 MMOL/L (ref 10–20)
BASOPHILS # BLD AUTO: 0.03 X10*3/UL (ref 0–0.1)
BASOPHILS NFR BLD AUTO: 0.3 %
BUN SERPL-MCNC: 22 MG/DL (ref 6–23)
CALCIUM SERPL-MCNC: 8.1 MG/DL (ref 8.6–10.3)
CHLORIDE SERPL-SCNC: 104 MMOL/L (ref 98–107)
CO2 SERPL-SCNC: 24 MMOL/L (ref 21–32)
CREAT SERPL-MCNC: 0.77 MG/DL (ref 0.5–1.05)
EGFRCR SERPLBLD CKD-EPI 2021: 72 ML/MIN/1.73M*2
EOSINOPHIL # BLD AUTO: 0.23 X10*3/UL (ref 0–0.4)
EOSINOPHIL NFR BLD AUTO: 2 %
ERYTHROCYTE [DISTWIDTH] IN BLOOD BY AUTOMATED COUNT: 14 % (ref 11.5–14.5)
GLUCOSE SERPL-MCNC: 140 MG/DL (ref 74–99)
HCT VFR BLD AUTO: 31.2 % (ref 36–46)
HGB BLD-MCNC: 10.2 G/DL (ref 12–16)
HOLD SPECIMEN: NORMAL
IMM GRANULOCYTES # BLD AUTO: 0.07 X10*3/UL (ref 0–0.5)
IMM GRANULOCYTES NFR BLD AUTO: 0.6 % (ref 0–0.9)
LYMPHOCYTES # BLD AUTO: 0.64 X10*3/UL (ref 0.8–3)
LYMPHOCYTES NFR BLD AUTO: 5.6 %
MCH RBC QN AUTO: 29.7 PG (ref 26–34)
MCHC RBC AUTO-ENTMCNC: 32.7 G/DL (ref 32–36)
MCV RBC AUTO: 91 FL (ref 80–100)
MONOCYTES # BLD AUTO: 0.79 X10*3/UL (ref 0.05–0.8)
MONOCYTES NFR BLD AUTO: 6.9 %
NEUTROPHILS # BLD AUTO: 9.75 X10*3/UL (ref 1.6–5.5)
NEUTROPHILS NFR BLD AUTO: 84.6 %
NRBC BLD-RTO: 0 /100 WBCS (ref 0–0)
PLATELET # BLD AUTO: 252 X10*3/UL (ref 150–450)
POTASSIUM SERPL-SCNC: 3.6 MMOL/L (ref 3.5–5.3)
RBC # BLD AUTO: 3.44 X10*6/UL (ref 4–5.2)
SODIUM SERPL-SCNC: 135 MMOL/L (ref 136–145)
WBC # BLD AUTO: 11.5 X10*3/UL (ref 4.4–11.3)

## 2025-01-17 PROCEDURE — 2500000001 HC RX 250 WO HCPCS SELF ADMINISTERED DRUGS (ALT 637 FOR MEDICARE OP): Performed by: STUDENT IN AN ORGANIZED HEALTH CARE EDUCATION/TRAINING PROGRAM

## 2025-01-17 PROCEDURE — 80048 BASIC METABOLIC PNL TOTAL CA: CPT | Performed by: STUDENT IN AN ORGANIZED HEALTH CARE EDUCATION/TRAINING PROGRAM

## 2025-01-17 PROCEDURE — 99239 HOSP IP/OBS DSCHRG MGMT >30: CPT | Performed by: STUDENT IN AN ORGANIZED HEALTH CARE EDUCATION/TRAINING PROGRAM

## 2025-01-17 PROCEDURE — 2500000001 HC RX 250 WO HCPCS SELF ADMINISTERED DRUGS (ALT 637 FOR MEDICARE OP): Performed by: NURSE PRACTITIONER

## 2025-01-17 PROCEDURE — 99232 SBSQ HOSP IP/OBS MODERATE 35: CPT | Performed by: NURSE PRACTITIONER

## 2025-01-17 PROCEDURE — 85025 COMPLETE CBC W/AUTO DIFF WBC: CPT | Performed by: STUDENT IN AN ORGANIZED HEALTH CARE EDUCATION/TRAINING PROGRAM

## 2025-01-17 PROCEDURE — 97530 THERAPEUTIC ACTIVITIES: CPT | Mod: GP,CQ

## 2025-01-17 PROCEDURE — 2500000004 HC RX 250 GENERAL PHARMACY W/ HCPCS (ALT 636 FOR OP/ED): Mod: JZ | Performed by: STUDENT IN AN ORGANIZED HEALTH CARE EDUCATION/TRAINING PROGRAM

## 2025-01-17 PROCEDURE — 36415 COLL VENOUS BLD VENIPUNCTURE: CPT | Performed by: STUDENT IN AN ORGANIZED HEALTH CARE EDUCATION/TRAINING PROGRAM

## 2025-01-17 RX ORDER — CIPROFLOXACIN 500 MG/1
500 TABLET ORAL 2 TIMES DAILY
Qty: 6 TABLET | Refills: 0 | Status: SHIPPED | OUTPATIENT
Start: 2025-01-17 | End: 2025-01-20

## 2025-01-17 RX ORDER — METRONIDAZOLE 500 MG/1
500 TABLET ORAL 3 TIMES DAILY
Qty: 9 TABLET | Refills: 0 | Status: SHIPPED | OUTPATIENT
Start: 2025-01-17 | End: 2025-01-20

## 2025-01-17 RX ADMIN — PANTOPRAZOLE SODIUM 40 MG: 40 TABLET, DELAYED RELEASE ORAL at 09:33

## 2025-01-17 RX ADMIN — NYSTATIN 1 APPLICATION: 100000 POWDER TOPICAL at 09:37

## 2025-01-17 RX ADMIN — METRONIDAZOLE 500 MG: 500 INJECTION, SOLUTION INTRAVENOUS at 03:47

## 2025-01-17 RX ADMIN — METOPROLOL SUCCINATE 37.5 MG: 25 TABLET, EXTENDED RELEASE ORAL at 09:33

## 2025-01-17 RX ADMIN — CEFEPIME 1 G: 1 INJECTION, POWDER, FOR SOLUTION INTRAMUSCULAR; INTRAVENOUS at 04:29

## 2025-01-17 RX ADMIN — METRONIDAZOLE 500 MG: 500 INJECTION, SOLUTION INTRAVENOUS at 11:34

## 2025-01-17 ASSESSMENT — COGNITIVE AND FUNCTIONAL STATUS - GENERAL
CLIMB 3 TO 5 STEPS WITH RAILING: A LITTLE
CLIMB 3 TO 5 STEPS WITH RAILING: TOTAL
MOBILITY SCORE: 18
HELP NEEDED FOR BATHING: A LITTLE
TOILETING: A LITTLE
MOVING FROM LYING ON BACK TO SITTING ON SIDE OF FLAT BED WITH BEDRAILS: A LITTLE
MOVING TO AND FROM BED TO CHAIR: A LITTLE
STANDING UP FROM CHAIR USING ARMS: A LITTLE
DRESSING REGULAR LOWER BODY CLOTHING: A LITTLE
WALKING IN HOSPITAL ROOM: A LITTLE
MOVING TO AND FROM BED TO CHAIR: A LITTLE
WALKING IN HOSPITAL ROOM: A LITTLE
TURNING FROM BACK TO SIDE WHILE IN FLAT BAD: A LITTLE
STANDING UP FROM CHAIR USING ARMS: A LITTLE
MOVING FROM LYING ON BACK TO SITTING ON SIDE OF FLAT BED WITH BEDRAILS: A LITTLE
TURNING FROM BACK TO SIDE WHILE IN FLAT BAD: A LITTLE
DRESSING REGULAR UPPER BODY CLOTHING: A LITTLE
DAILY ACTIVITIY SCORE: 20
MOBILITY SCORE: 16

## 2025-01-17 ASSESSMENT — PAIN - FUNCTIONAL ASSESSMENT: PAIN_FUNCTIONAL_ASSESSMENT: 0-10

## 2025-01-17 ASSESSMENT — PAIN SCALES - GENERAL: PAINLEVEL_OUTOF10: 0 - NO PAIN

## 2025-01-17 NOTE — PROGRESS NOTES
"Physical Therapy    Physical Therapy Treatment    Patient Name: Mary Burden  MRN: [id number]  Today's Date: 1/17/2025  Time Calculation  Start Time: 1244  Stop Time: 1318  Time Calculation (min): 34 min     1012/1012-A    Assessment/Plan   End of Session Communication: Bedside nurse    End of Session Patient Position:  (Patient reclined in chair after treatment. Call light/tray in reach. Chair alarm on.  Warm blankets provided.)    PT Plan  Inpatient/Swing Bed or Outpatient: Inpatient  Treatment/Interventions: Bed mobility, Transfer training, Gait training, Stair training, Balance training, Strengthening, Endurance training, Therapeutic exercise, Therapeutic activity  PT Plan: Ongoing PT  PT Frequency: 3 times per week  PT Discharge Recommendations: Low intensity level of continued care    PT Recommended Transfer Status: Assist x1, Assistive device    General Visit Information:   PT  Visit  PT Received On: 01/17/25    General  Prior to Session Communication:  (Cleared by RN for PT)  Patient Position Received:  (Patient presents in bed, required max encouragement for participation. \"Of course you would come during my soap opera.  I was already up today. I walked to the bathroom\")    General Comment:  (Dx: community aquired PNA and acute pancreatitis)    General Observations:   General Observation:  (Patient prefers to be called \"Alberto\"   IV; alarm)    Subjective  \"I wanted to jump out the window last night.  I didn't know what was going on\"  \"They told me I can go home today\"    Precautions:  Precautions  Medical Precautions: Fall precautions    Vital Signs:  Vital Signs  SpO2:  (Unable to obtain accurate Sp02 reading.  Patient wearing nail polish)      Pain:  Pain Assessment  Pain Assessment: 0-10  0-10 (Numeric) Pain Score: 0 - No pain    Cognition:  Cognition  Overall Cognitive Status: Impaired  Safety/Judgement: Exceptions to WFL      Balance:   Dynamic Standing Balance  Dynamic Standing-Balance:  (Fair dynamic " stand with ww)      Activity Tolerance:  Activity Tolerance  Endurance:  (Endurance limited by SOB)      Therapeutic Exercise  Therapeutic Exercise Performed:  (seated LE ther-ex: AP; LAQ; seated marching; hip add (pillow squeeze) x10)        Bed Mobility  Bed Mobility:  (supine->sit: SBAx1  Bed flat to mimic home set up. Increased time and max encouragement needed for patient to initiate movement.)    Ambulation/Gait Training  Ambulation/Gait Training Performed:  (Patient amb 100' with ww and SBAx1. Slow murali, NBOS. SOB with exertion.)    Transfers  Transfer:  (sit->stand: SBAx1  One stand performed from EOB.  Instructions needed for hand placement.    stand->sit: SBAx1 poor eccentric sit)         Outcome Measures:   [facility] Basic Mobility  Turning from your back to your side while in a flat bed without using bedrails: A little  Moving from lying on your back to sitting on the side of a flat bed without using bedrails: A little  Moving to and from bed to chair (including a wheelchair): A little  Standing up from a chair using your arms (e.g. wheelchair or bedside chair): A little  To walk in hospital room: A little  Climbing 3-5 steps with railing: Total  Basic Mobility - Total Score: 16            Education Documentation  Mobility Training, taught by Asmita Sky PTA at 1/17/2025  1:31 PM.  Learner: Patient  Readiness: Acceptance  Method: Explanation, Demonstration  Response: Needs Reinforcement    Education Comments  Individual(s) Educated: Patient  Equipment:  (Discussed using a ww for amb. Patient reports she does has have a ww at home and is agreeable to use it until she becomes stronger and her balance improves.)    Encounter Problems       Encounter Problems (Active)       PT Problem       Pt will demonstrate mod I  with bed mobility to edge of bed.   (Progressing)       Start:  01/15/25    Expected End:  01/29/25            Pt will demonstrate mod I  with sit to stand/chair transfers with FWW.    (Progressing)       Start:  01/15/25    Expected End:  01/29/25            Pt will ambulate 100 feet with FWW MOD I .   (Progressing)       Start:  01/15/25    Expected End:  01/29/25            Pt to demo 3 steps with  rails with SUP  (Not Progressing)       Start:  01/15/25    Expected End:  01/29/25               Pain - Adult

## 2025-01-17 NOTE — PROGRESS NOTES
Mary Burden is a 93 y.o. female on day 3 of admission presenting with Community acquired pneumonia, unspecified laterality.    Subjective   GI following for acute pancreatitis. P/w generalized weakness, shortness of breath, upper abdominal pain.  CT abd/pelvis noting acute pancreatitis along with hemorrhagic pseudocyst and smaller pseudocyst.     Patient doing well.  No acute events overnight.  Still having abdominal bloating and discomfort.  No nausea, vomiting, diarrhea, constipation or black or bloody stools.  Hgb stable 10.2, WBC 15-->11.5.  Normal platelets.     Objective   Constitutional:       General: She is not in acute distress.     Appearance: Normal appearance. She is not toxic-appearing.   HENT:      Head: Normocephalic and atraumatic.      Nose: Nose normal.      Mouth/Throat:      Mouth: Mucous membranes are moist.      Pharynx: Oropharynx is clear.      Comments: No upper teeth, lower teeth missing and have cavities/decay.  Eyes:      Extraocular Movements: Extraocular movements intact.      Conjunctiva/sclera: Conjunctivae normal.      Pupils: Pupils are equal, round, and reactive to light.   Cardiovascular:      Rate and Rhythm: Normal rate and regular rhythm.      Pulses: Normal pulses.      Heart sounds: Normal heart sounds.   Pulmonary:      Effort: Pulmonary effort is normal. No respiratory distress.      Breath sounds: No wheezing.      Comments: No acute respiratory distress.  Breath sounds diminished to auscultation bilaterally.  Abdominal:      General: Bowel sounds are normal. There is no distension.      Palpations: Abdomen is soft.      Tenderness: There is abdominal tenderness. There is no guarding.      Comments: Abdomen soft, bowel sounds are present.  Diffusely tender to deep palpation.  No rebound or guarding.   Musculoskeletal:         General: Normal range of motion.      Cervical back: Normal range of motion and neck supple.   Neurological:      General: No focal deficit  "present.      Mental Status: She is alert and oriented to person, place, and time. Mental status is at baseline.   Psychiatric:         Mood and Affect: Mood normal.         Behavior: Behavior normal.         Thought Content: Thought content normal.        Last Recorded Vitals  Blood pressure 97/54, pulse 78, temperature 36.7 °C (98.1 °F), resp. rate 18, height 1.6 m (5' 3\"), weight 45.4 kg (100 lb), SpO2 97%.  Intake/Output last 3 Shifts:  I/O last 3 completed shifts:  In: 3184.2 (70.2 mL/kg) [P.O.:240; I.V.:2225 (49.1 mL/kg); IV Piggyback:719.2]  Out: - (0 mL/kg)   Weight: 45.4 kg     Relevant Results    CT angio chest for pulmonary embolism    Result Date: 1/14/2025  STUDY: CT Angiogram of the Chest, CT Abdomen and Pelvis with IV Contrast; 01/14/2025 at 4:12 PM INDICATION: Shortness of breath. Leg swelling. Lower abdominal pain. COMPARISON: XR chest 01/14/25. ACCESSION NUMBER(S): [id number], [id number] ORDERING CLINICIAN: MARK VOGEL TECHNIQUE:  CTA of the chest was performed following rapid injection of intravenous contrast.  Images are reviewed and processed at a workstation according to the CT angiogram protocol with 3-D and/or MIP post processing imaging generated.  CT of the abdomen and pelvis was performed with intravenous contrast.  Omnipaque-350 75 mL was administered intravenously; positive oral contrast was given. Automated mA/kV exposure control was utilized and patient examination was performed in strict accordance with principles of ALARA. FINDINGS:  No filling defects are seen within the main pulmonary artery, left or right pulmonary arteries, or first order branches to indicate an embolus.  No early filling veins are visualized indicate an arteriovenous malformation.  Emphysematous changes are seen within the lungs bilaterally.  There are peripheral interstitial markings bilaterally, which may indicate a chronic process such as fibrosis. There is a tiny right pleural effusion.  No enlarged " lymph nodes are seen in the mediastinal or hilar regions.  There is no pericardial effusion.  Coronary artery calcifications are present.  There is aneurysmal dilatation of the ascending aorta at 4.3 x 4.0 cm.  The aortic arch is measured at 3.4 cm.  The proximal descending thoracic aorta is measured at 3.2 cm.  The distal descending thoracic aorta is measured at 2.6 cm.  Degenerative changes are seen throughout the thoracic spine.  There is a nodular contour of the liver, indicative of cirrhosis. There appears be partial cavernous transformation of the portal vein. The hepatic veins appear patent.  Gallstones are present.  There is moderate distention of the gallbladder.  The spleen is not enlarged. There appears to be chronic thrombosis of the splenic vein.  There are inflammatory changes surrounding the pancreas, indicative of acute hepatitis.  There are multiple fluid collections are visualized, indicative of cirrhosis.  There appears be a hemorrhagic pseudocyst posterior to the head measured at 5.2 x 4.4 x 9.9 cm(Series 602, Image 58; Series 601, Image 53).  The hemorrhagic component is seen in the superior aspect of the pseudocyst is estimated 1.8 x 2.0 cm.  A smaller pseudocyst is seen anterior to the pancreatic head measured at 1.5 x 1.6 cm(Series 601, Image 49).  No adrenal nodule is noted.  No acute findings are seen within either kidney.  There is a mild to moderate amount of abdominal ascites.  No dilated loops of small bowel or colon are visually.  There are diverticula seen throughout the colon.  There is no evidence of diverticulitis.  The appendix is unremarkable in appearance.  No enlarged lymph nodes are seen in the pelvic sidewalls, iliac chains, or retroperitoneum.  There is aneurysmal dilatation of the abdominal aorta at 3.2 cm.  No prominent edema is seen within the psoas musculature.  There is generalized anasarca.  Degenerative changes are seen throughout the lumbar spine.    CHEST: 1.  No  evidence of a pulmonary embolus. 2.  Emphysematous changes. 3.  Peripheral interstitial markings, most likely a chronic process such as fibrosis. 4.  Tiny right pleural effusion. 5.  Coronary artery calcifications. 6.  Aneurysmal dilatation of the thoracic aorta. ABDOMEN/PELVIS: 1.  Acute pancreatitis, with development of pseudocysts off of the pancreatic head.  The larger pseudocyst displays a small focus of internal hemorrhage.  Correlation with serum CBC is recommended. 2.  Cirrhotic morphology of the liver, with cavernous transformation of the portal vein. 3.  Chronic thrombosis of the splenic vein. 4.  Cholelithiasis. 5.  Mild to moderate abdominal ascites. 6.  Anasarca. 7.  Aneurysmal dilatation of the abdominal aorta. 8.  Colonic diverticulosis, no evidence of diverticulitis. This report was called to Dr. Mark Vogel at 4:55 PM on January 14, 2025. Signed by Jonathan Farias MD    CT abdomen pelvis w IV contrast    Result Date: 1/14/2025  STUDY: CT Angiogram of the Chest, CT Abdomen and Pelvis with IV Contrast; 01/14/2025 at 4:12 PM INDICATION: Shortness of breath. Leg swelling. Lower abdominal pain. COMPARISON: XR chest 01/14/25. ACCESSION NUMBER(S): [id number], [id number] ORDERING CLINICIAN: MARK VOGEL TECHNIQUE:  CTA of the chest was performed following rapid injection of intravenous contrast.  Images are reviewed and processed at a workstation according to the CT angiogram protocol with 3-D and/or MIP post processing imaging generated.  CT of the abdomen and pelvis was performed with intravenous contrast.  Omnipaque-350 75 mL was administered intravenously; positive oral contrast was given. Automated mA/kV exposure control was utilized and patient examination was performed in strict accordance with principles of ALARA. FINDINGS:  No filling defects are seen within the main pulmonary artery, left or right pulmonary arteries, or first order branches to indicate an embolus.  No early filling veins  are visualized indicate an arteriovenous malformation.  Emphysematous changes are seen within the lungs bilaterally.  There are peripheral interstitial markings bilaterally, which may indicate a chronic process such as fibrosis. There is a tiny right pleural effusion.  No enlarged lymph nodes are seen in the mediastinal or hilar regions.  There is no pericardial effusion.  Coronary artery calcifications are present.  There is aneurysmal dilatation of the ascending aorta at 4.3 x 4.0 cm.  The aortic arch is measured at 3.4 cm.  The proximal descending thoracic aorta is measured at 3.2 cm.  The distal descending thoracic aorta is measured at 2.6 cm.  Degenerative changes are seen throughout the thoracic spine.  There is a nodular contour of the liver, indicative of cirrhosis. There appears be partial cavernous transformation of the portal vein. The hepatic veins appear patent.  Gallstones are present.  There is moderate distention of the gallbladder.  The spleen is not enlarged. There appears to be chronic thrombosis of the splenic vein.  There are inflammatory changes surrounding the pancreas, indicative of acute hepatitis.  There are multiple fluid collections are visualized, indicative of cirrhosis.  There appears be a hemorrhagic pseudocyst posterior to the head measured at 5.2 x 4.4 x 9.9 cm(Series 602, Image 58; Series 601, Image 53).  The hemorrhagic component is seen in the superior aspect of the pseudocyst is estimated 1.8 x 2.0 cm.  A smaller pseudocyst is seen anterior to the pancreatic head measured at 1.5 x 1.6 cm(Series 601, Image 49).  No adrenal nodule is noted.  No acute findings are seen within either kidney.  There is a mild to moderate amount of abdominal ascites.  No dilated loops of small bowel or colon are visually.  There are diverticula seen throughout the colon.  There is no evidence of diverticulitis.  The appendix is unremarkable in appearance.  No enlarged lymph nodes are seen in the  pelvic sidewalls, iliac chains, or retroperitoneum.  There is aneurysmal dilatation of the abdominal aorta at 3.2 cm.  No prominent edema is seen within the psoas musculature.  There is generalized anasarca.  Degenerative changes are seen throughout the lumbar spine.    CHEST: 1.  No evidence of a pulmonary embolus. 2.  Emphysematous changes. 3.  Peripheral interstitial markings, most likely a chronic process such as fibrosis. 4.  Tiny right pleural effusion. 5.  Coronary artery calcifications. 6.  Aneurysmal dilatation of the thoracic aorta. ABDOMEN/PELVIS: 1.  Acute pancreatitis, with development of pseudocysts off of the pancreatic head.  The larger pseudocyst displays a small focus of internal hemorrhage.  Correlation with serum CBC is recommended. 2.  Cirrhotic morphology of the liver, with cavernous transformation of the portal vein. 3.  Chronic thrombosis of the splenic vein. 4.  Cholelithiasis. 5.  Mild to moderate abdominal ascites. 6.  Anasarca. 7.  Aneurysmal dilatation of the abdominal aorta. 8.  Colonic diverticulosis, no evidence of diverticulitis. This report was called to Dr. Ollie Bernstein at 4:55 PM on January 14, 2025. Signed by Jonathan Farias MD    CT abdomen pelvis w IV contrast    Result Date: 12/29/2024  * * *Final Report* * * DATE OF EXAM: Dec 28 2024 11:17PM   [facility]   0530  -  CT ABD/PEL W IVCON  / ACCESSION #  [id number] PROCEDURE REASON: Abdominal pain, acute, nonlocalized      * * * * Physician Interpretation * * * *  EXAMINATION:  CT ABD/PEL W IVCON INDICATION: Abdominal pain, acute, nonlocalized Abdominal pain, acute, nonlocalized COMPARISON: CT chest 10/06/2021. TECHNIQUE: CT of the abdomen and pelvis was performed using standard technique, scanning from just above the dome of the diaphragm to the pubic symphysis. Contrast: IV:  75 ml of Omnipaque 350 : ml of CT Radiation dose: Integrated Dose-length product (DLP) for this visit =   221 mGy*cm. CT Dose Reduction Employed: Automated  exposure control(AEC) and iterative recon FINDINGS: Lower Thorax: Fibrotic changes noted.  Dilated ascending aorta, incompletely assessed. Liver:  No mass. Gallbladder/Biliary: Cholelithiasis.  No bile duct dilation. Spleen: Unremarkable. Pancreas: Heterogeneous enhancement with areas of relative low attenuation in the proximal body, neck and head.  No main duct dilation.   Tracking peripancreatic fluid. Adrenals: No mass. Kidneys: No suspicious mass.  No hydronephrosis. Vessels: -Severe narrowing of the central superior mesenteric vein. -Atherosclerotic disease.  Irregular noncalcified plaque of the descending thoracic aorta, similar to prior chest CT.   Infrarenal abdominal aortic aneurysm, 3.4 cm.  Right common iliac artery ectasia, 1.7 cm. GI Tract: Diverticulosis.  No wall thickening or obstruction.  Normal appendix. Pelvis: No pelvic mass. Mesentery/Peritoneum/Retroperitoneum: See above.  Small ascites. Lymph Nodes: No lymphadenopathy. Bones, soft tissues: No suspicious bone lesions.  No acute findings.  (topogram) images: No additional findings.    IMPRESSION: 1.  Acute pancreatitis. -Relative areas of low attenuation; suggest short-term follow-up CT pancreas protocol to better assess for developing parenchymal necrosis. -Severe central SMV narrowing. 2.  Infrarenal abdominal aortic aneurysm (3.4 cm). : [facility]   Transcribe Date/Time: Dec 29 2024 12:19A Dictated by : GAMAL THURMAN MD This examination was interpreted and the report reviewed and electronically signed by: GAMAL THURMAN MD on Dec 29 2024 12:43AM  EST    US gallbladder    Result Date: 12/29/2024  * * *Final Report* * * DATE OF EXAM: Dec 28 2024 11:40PM   [facility]   1032  -  US ABD RIGHT UPPER QUADRANT  / ACCESSION #  [id number] PROCEDURE REASON: Abn liver function tests (LFTs)      * * * * Physician Interpretation * * * *  EXAM: US ABD RIGHT UPPER QUADRANT DATE: 12/28/2024 11:40 PM INDICATION: , Abn liver function tests (LFTs)  COMPARISON: Same day CT TECHNIQUE: Transverse and longitudinal gray scale and color doppler sonographic images of the right upper abdomen were obtained. FINDINGS: LIVER Normal echogenicity. Normal contour. No masses. GALLBLADDER Cholelithiasis and sludge.  No wall thickening or pericholecystic fluid. Negative sonographic Lopez's sign. BILE DUCTS No intra hepatic biliary dilation. Common bile duct measures 0.5 cm, normal. PANCREAS: Better assessed on CT. RIGHT KIDNEY: Echogenicity: Normal Collecting System:  No hydronephrosis Stones:  None Cyst/Mass: None FREE FLUID: None OTHER: Infrarenal abdominal aortic aneurysm, better assessed on CT.    IMPRESSION: 1.  Cholelithiasis and sludge.  No evidence of acute cholecystitis. : [facility]   Transcribe Date/Time: Dec 29 2024 12:19A Dictated by : GAMAL THURMAN MD This examination was interpreted and the report reviewed and electronically signed by: GAMAL THURMAN MD on Dec 29 2024 12:22AM  EST    Lab Results   Component Value Date    WBC 11.5 (H) 01/17/2025    HGB 10.2 (L) 01/17/2025    HCT 31.2 (L) 01/17/2025    MCV 91 01/17/2025     01/17/2025     Lab Results   Component Value Date    ALT 6 (L) 01/15/2025    AST 18 01/15/2025    ALKPHOS 33 01/15/2025    BILITOT 1.5 (H) 01/15/2025     Lab Results   Component Value Date    GLUCOSE 140 (H) 01/17/2025    CALCIUM 8.1 (L) 01/17/2025     (L) 01/17/2025    K 3.6 01/17/2025    CO2 24 01/17/2025     01/17/2025    BUN 22 01/17/2025    CREATININE 0.77 01/17/2025     Lab Results   Component Value Date    INR 1.6 (H) 01/14/2025    PROTIME 17.7 (H) 01/14/2025     === 01/14/25 ===    US ABDOMEN LIMITED    - Impression -  Trace ascites. Volume of fluid is insufficient for paracentesis at  this time.      Assessment/Plan   GI following for acute pancreatitis. P/w generalized weakness, shortness of breath, upper abdominal pain.  CT abd/pelvis noting acute pancreatitis along with hemorrhagic pseudocyst and  smaller pseudocyst.     Acute pancreatitis with development of moderate to large size not walled off pseudocysts.    Cirrhosis with large amount of ascites  Alcohol abuse - last alcohol beverage 12/28/24  Chronic thrombus of splenic vein     -Recommend diagnostic and therapeutic paracentesis. Please check cytology, cell count, culture, albumin, protein, LDH, amylase on ascitic fluid. We need to determine if the ascites is coming from the liver vs pancreas.  According to ultrasound 1/14 only trace ascites, not enough fluid to perform paracentesis.  -Repeat imaging CT pancreas protocol in 4 weeks  -For now, we recommend conservative treatment for acute pancreatitis.  -Continue to monitor H&H and transfuse PRBC for Hgb <7.0  -Monitor stool color and consistency and document  -Continue IV antibiotics at this time to cover for pancreatic necrosis   -Recommend PPI 40 twice daily  -Serial abdominal exam  -If Hgb drops significantly or patient has hemodynamic instability, consider stat CT pancreas to evaluate for hemorrhagic pseudocyst.  -MiraLAX 1-2 times daily  -Clear liquid diet, advance to low fat diet as tolerated   -Trend CMP, CBC  -Triglycerides normal 68, IgG4 normal.  autoimmune markers collected, results pending  -HSV 1, HSV-2, VZV not detected.  Toxicology screen negative.  -Follow up with GI 2 weeks after discharge     I spent 30 minutes in the professional and overall care of this patient.      Geneva Newton, LURDES-CNP

## 2025-01-17 NOTE — DISCHARGE SUMMARY
Medical Group Discharge Summary  DISCHARGE DIAGNOSIS     Acute pancreatitis with development of pseudocyst  Leukocytosis, resolved  Anemia of chronic disease  Alcohol use disorder  Alcohol liver disease with cirrhosis  Chronic splenic vein thrombosis  Severe protein calorie malnutrition    HOSPITAL COURSE AND DETAILS     This is a 93-year-old female with a significant past medical history of GERD, previous alcohol use disorder who recently quit in the last 2 to 3 weeks, hypertension, hyperlipidemia that presented from home with chief complaints of shortness of breath, weakness, abdominal pain.    Patient had a hospitalization from 12/28 to 1/1 at [facility] for acute pancreatitis in setting of alcohol use.  Was seen by GI and general surgery at that time recommended outpatient follow-up for further interventions.  From a acute pancreatitis picture, CT abdomen/pelvis was unremarkable at that time with no issues besides the acute pancreatitis, no cysts etc.    Patient also found to have chronic splenic venous thrombosis.  This does not require treatment at this time per GI.    Patient was found to have multiple pancreatic pseudocyst with concerns for hemorrhagic pseudocyst during hospitalization.  Was seen by GI that recommended conservative management.  Attempted to have IR do a paracentesis however there was not enough fluid seen on repeat ultrasound therefore this was aborted.  Patient was treated conservatively with IV fluids and diet was advanced.  White blood cell count continue to downtrend from 22,000 down to 11,000 on discharge.  Patient did not have any significant drop in hemoglobin as well.     Plan was to discharge to SNF however patient and family are now requesting discharge home this afternoon.  They will complete a short course of oral antibiotics.  Will need to be seen as an outpatient by PCP, pulmonology, GI.    Total time spent on discharge: >30min      ---Of note, this documentation is  completed using the Dragon Dictation system (voice recognition software). There may be spelling and/or grammatical errors that were not corrected prior to final submission.---    Brady Betancourt MD    DISCHARGE PHYSICAL EXAM     Last Recorded Vitals:  Vitals:    01/16/25 2322 01/17/25 0337 01/17/25 0725 01/17/25 1406   BP: 95/52 100/60 97/54 100/58   BP Location:       Patient Position:       Pulse: 82 86 78 92   Resp: 18 18     Temp: 36.2 °C (97.2 °F) 36.2 °C (97.2 °F) 36.7 °C (98.1 °F) 36.1 °C (97 °F)   TempSrc:       SpO2:  99% 97% 97%   Weight:       Height:           Physical Exam  Vitals reviewed.   Constitutional:       General: She is not in acute distress.     Appearance: Normal appearance. She is not toxic-appearing.   HENT:      Head: Normocephalic and atraumatic.   Eyes:      Extraocular Movements: Extraocular movements intact.      Conjunctiva/sclera: Conjunctivae normal.   Cardiovascular:      Rate and Rhythm: Normal rate and regular rhythm.      Pulses: Normal pulses.      Heart sounds: Normal heart sounds.   Pulmonary:      Effort: Pulmonary effort is normal. No respiratory distress.      Breath sounds: Normal breath sounds. No wheezing.   Abdominal:      General: Bowel sounds are normal. There is no distension.      Palpations: Abdomen is soft.      Tenderness: There is no abdominal tenderness. There is no guarding.      Comments: Improved abdominal exam, no tenderness or distention today.   Musculoskeletal:         General: Normal range of motion.      Cervical back: Normal range of motion and neck supple.   Neurological:      General: No focal deficit present.      Mental Status: She is alert and oriented to person, place, and time. Mental status is at baseline.   Psychiatric:         Mood and Affect: Mood normal.         Behavior: Behavior normal.         Thought Content: Thought content normal.       DISCHARGE MEDICATIONS        Your medication list        START taking these medications         Instructions Last Dose Given Next Dose Due   ciprofloxacin 500 mg tablet  Commonly known as: Cipro      Take 1 tablet (500 mg) by mouth 2 times a day for 3 days.       metroNIDAZOLE 500 mg tablet  Commonly known as: Flagyl      Take 1 tablet (500 mg) by mouth 3 times a day for 3 days.              CONTINUE taking these medications        Instructions Last Dose Given Next Dose Due   metoprolol succinate XL 25 mg 24 hr tablet  Commonly known as: Toprol-XL           mirtazapine 15 mg tablet  Commonly known as: Remeron           omeprazole OTC 20 mg EC tablet  Commonly known as: PriLOSEC OTC           rosuvastatin 10 mg tablet  Commonly known as: Crestor                     Where to Get Your Medications        These medications were sent to [id number] - [place], [place] - [address]  [address] [place] [address] [place] [address]      Phone: [phone]   ciprofloxacin 500 mg tablet  metroNIDAZOLE 500 mg tablet           OUTPATIENT FOLLOW-UP     No future appointments.

## 2025-01-17 NOTE — CARE PLAN
The patient's goals for the shift include      The clinical goals for the shift include Pt will be weaned from oxygen this shift    Over the shift, the patient did not make progress toward the following goals. Barriers to progression include confusion, age. Recommendations to address these barriers include education, ambulate.

## 2025-01-17 NOTE — PROGRESS NOTES
SUBJECTIVE      Patient was seen and examined bedside this morning.  Patient is doing today.  She is being discharged to a skilled nursing facility.  She is currently off oxygen.    OBJECTIVE:      Last Recorded Vitals: Vitals Vitals: 01/16/25 0743 01/16/25 0744 01/16/25 1044 01/16/25 1336 BP: 109/66 108/64 120/78 BP Location: Right arm      Patient Position: Lying Sitting Pulse: 79 89 Resp: 18 18 Temp: 35.5 °C (95.9 °F) 35.8 °C (96.4 °F) TempSrc: Temporal      SpO2: 97% 97% 95% Weight:      Height:       Last I/O: I/O last 3 completed shifts: In: 1270.8 (28 mL/kg) [I.V.:50 (1.1 mL/kg); IV Piggyback:1220.8] Out: 400 (8.8 mL/kg) [Urine:400 (0.2 mL/kg/hr)] Weight: 45.4 kg       Physical Exam Vitals reviewed. Constitutional:       General: She is not in acute distress. Appearance: Normal appearance. She is not toxic-appearing. HENT: Head: Normocephalic and atraumatic. Eyes: Extraocular Movements: Extraocular movements intact. Conjunctiva/sclera: Conjunctivae normal. Cardiovascular: Rate and Rhythm: Normal rate and regular rhythm. Pulses: Normal pulses. Heart sounds: Normal heart sounds. Pulmonary: Effort: Pulmonary effort is normal. No respiratory distress. Breath sounds: Normal breath sounds. No wheezing. Abdominal: General: Bowel sounds are normal. There is no distension. Palpations: Abdomen is soft. Tenderness: There is abdominal tenderness. There is no guarding. Musculoskeletal:       General: Normal range of motion. Cervical back: Normal range of motion and neck supple. Neurological: General: No focal deficit present. Mental Status: She is alert and oriented to person, place, and time. Mental status is at baseline. Psychiatric:       Mood and Affect: Mood normal.       Behavior: Behavior normal.       Thought Content: Thought content normal.       Inpatient Medications:      Scheduled Medications cefepime, 1 g, intravenous, q12h metoprolol succinate XL, 37.5 mg, oral, Daily metroNIDAZOLE, 500 mg, intravenous, q8h  mirtazapine, 15 mg, oral, Nightly nystatin, 1 Application, Topical, BID pantoprazole, 40 mg, oral, BID Or pantoprazole, 40 mg, intravenous, BID rosuvastatin, 10 mg, oral, Nightly      PRN Medications PRN Medications PRN medications: acetaminophen OR acetaminophen OR acetaminophen, calcium carbonate, melatonin, polyethylene glycol      Continuous Medications:      Continuous Medications lactated Ringer's, 100 mL/hr, Last Rate: 100 mL/hr (01/16/25 1118)      LABS AND IMAGING:      Labs: Results from last 7 days Lab Units 01/16/25 0526 01/15/25 0444 01/14/25 1425 WBC AUTO x103/uL 15.1 22.1* 22.4* RBC AUTO x106/uL 3.32 3.68* 3.83* HEMOGLOBIN g/dL 10.0* 11.2* 11.5* HEMATOCRIT % 29.5* 33.4* 35.0* MCV fL 89 91 91 MCH pg 30.1 30.4 30.0 MCHC g/dL 33.9 33.5 32.9 RDW % 13.7 14.0 13.9 PLATELETS AUTO x10*3/uL 238 223 300      Results from last 7 days Lab Units 01/16/25 0526 01/15/25 0444 01/14/25 1425 SODIUM mmol/L 136 136 138 POTASSIUM mmol/L 3.6 3.9 4.0 CHLORIDE mmol/L 104 104 105 CO2 mmol/L 24 21 23 BUN mg/dL 26* 22 22 CREATININE mg/dL 0.74 0.74 0.78 GLUCOSE mg/dL 129* 87 87 PROTEIN TOTAL g/dL -- 5.5* 5.6* CALCIUM mg/dL 7.7* 8.1* 8.1* BILIRUBIN TOTAL mg/dL -- 1.5* 1.5* ALK PHOS U/L -- 33 35 AST U/L -- 18 17 ALT U/L -- 6* 8      Results from last 7 days Lab Units 01/16/25 0526 01/15/25 0444 01/14/25 1425 MAGNESIUM mg/dL 1.87 1.97 1.41*      Results from last 7 days Lab Units 01/14/25 1620 01/14/25 1425 TROPHS ng/L 12 10      Imaging: ECG 12 lead Sinus rhythm with Blocked Premature atrial complexes Septal infarct , age undetermined Abnormal ECG When compared with ECG of 14-JAN-2025 14:14, (unconfirmed) Sinus rhythm has replaced Atrial fibrillation Nonspecific T wave abnormality has replaced inverted T waves in Lateral leads US abdomen limited Narrative: Interpreted By: Avinash Hidalgo, STUDY: US ABDOMEN LIMITED; 1/16/2025 11:14 am      INDICATION: Signs/Symptoms:paracentesis. Abdominal distension.      COMPARISON: CT  abdomen/pelvis of 01/14/2025.      ACCESSION NUMBER(S): [id number]      ORDERING CLINICIAN: DELIA VELIZ      TECHNIQUE: Sonographic 4 quadrant ascites survey was performed for fluid evaluation and potential paracentesis planning.      FINDINGS: Trace ascites is demonstrated throughout the 4 quadrants of the abdomen. Volume of fluid is insufficient for paracentesis at this time. No procedure was performed.      Impression: Trace ascites. Volume of fluid is insufficient for paracentesis at this time.      ASSESSMENT & PLAN:      Acute pancreatitis Hemorrhagic pancreatic pseudocyst      Presenting from home with chief complaints of generalized weakness, shortness of breath however does have abdominal pain and tenderness to deep palpation      CT abdomen/pelvis with concerns for acute pancreatitis along with hemorrhagic pseudocyst and smaller pseudocyst      GI consulted      Continue IV antibiotics at this time to cover for pancreatic necrosis until further evaluation with cefepime, metronidazole      LR 100ml/hr      Watching hemoglobin - 10 today (11.5 on admission)      Ascites      As seen on imaging      Unable to drain per ultrasound/radiology      Pulmonary fibrosis      As seen on CTA chestAlcohol use disorder      Has not drink any alcohol since her most recent admission at [facility]      Leukocytosis, likely reactive to above      Essential hypertension Hyperlipidemia Type II DM      Resume home medications once reconciled      Severe protein calorie malnutrition      Appreciate recommendations from registered dietitian      VTE Prophylaxis: SCDs (holding chemoprophylaxis in setting of hemorrhagic pseudocyst)      Disposition/Daily update: Slight drop in hemoglobin today, pain is improving, tolerating full liquid diet. Attempted to have ascites drained however not enough fluid seen on ultrasound per radiology. Continue IV fluids per GI. Will discuss neck step with GI, WBC count trending down continue  antibiotics.      Pulmonary comment:-Patient has questionable pulmonary fibrosis which need a outpatient follow-up with CT scan as recent CT of the chest will had a lot of motion artifacts.  Patient does not have pneumonia and antibiotics being used mainly for pancreatic pseudocyst.  Patient's hypoxia appears to have resolved. Plan of care discussed extensively with patient's daughters at the bedside.  I thank you for the referral.      I spent 25 minutes in the professional and overall care of this patient.

## 2025-01-17 NOTE — HH CARE COORDINATION
Home Care received a Referral for Physical Therapy and Occupational Therapy. We have processed the referral for a Start of Care on 1/19-1/20/25.     If you have any questions or concerns, please feel free to contact us at [phone]. Follow the prompts, enter your five digit zip code, and you will be directed to your care team on [facility].

## 2025-01-19 LAB
BACTERIA BLD CULT: NORMAL
BACTERIA BLD CULT: NORMAL

## 2025-01-20 ENCOUNTER — HOME CARE VISIT (OUTPATIENT)
Dept: HOME HEALTH SERVICES | Facility: HOME HEALTH | Age: OVER 89
End: 2025-01-20
Payer: MEDICARE

## 2025-01-20 VITALS
DIASTOLIC BLOOD PRESSURE: 60 MMHG | SYSTOLIC BLOOD PRESSURE: 102 MMHG | HEART RATE: 87 BPM | TEMPERATURE: 98 F | RESPIRATION RATE: 18 BRPM

## 2025-01-20 PROCEDURE — G0151 HHCP-SERV OF PT,EA 15 MIN: HCPCS

## 2025-01-20 SDOH — HEALTH STABILITY: PHYSICAL HEALTH: EXERCISE TYPE: BLE PER HEP

## 2025-01-20 ASSESSMENT — ENCOUNTER SYMPTOMS
PERSON REPORTING PAIN: PATIENT
HIGHEST PAIN SEVERITY IN PAST 24 HOURS: 8/10
LOWEST PAIN SEVERITY IN PAST 24 HOURS: 5/10
DIZZINESS: 1
PAIN: 1
PAIN LOCATION: ABDOMEN
PAIN SEVERITY GOAL: 0/10

## 2025-01-20 ASSESSMENT — ACTIVITIES OF DAILY LIVING (ADL)
ENTERING_EXITING_HOME: STAND BY ASSIST
AMBULATION ASSISTANCE: 1
PHYSICAL TRANSFERS ASSESSED: 1
OASIS_M1830: 03
AMBULATION_DISTANCE/DURATION_TOLERATED: 50'
AMBULATION ASSISTANCE ON FLAT SURFACES: 1
AMBULATION ASSISTANCE: STAND BY ASSIST
CURRENT_FUNCTION: STAND BY ASSIST

## 2025-01-21 ENCOUNTER — HOME CARE VISIT (OUTPATIENT)
Dept: HOME HEALTH SERVICES | Facility: HOME HEALTH | Age: OVER 89
End: 2025-01-21
Payer: MEDICARE

## 2025-01-21 LAB
MITOCHONDRIA AB SER QL IF: NEGATIVE
SMOOTH MUSCLE AB SER QL IF: NEGATIVE

## 2025-01-21 PROCEDURE — G0152 HHCP-SERV OF OT,EA 15 MIN: HCPCS

## 2025-01-21 SDOH — ECONOMIC STABILITY: HOUSING INSECURITY: TEMPORARY ARRANGEMENT: 1

## 2025-01-21 ASSESSMENT — ACTIVITIES OF DAILY LIVING (ADL)
AMBULATION ASSISTANCE: CONTACT GUARD ASSIST
PHYSICAL TRANSFERS ASSESSED: 1
CURRENT_FUNCTION: CONTACT GUARD ASSIST
AMBULATION ASSISTANCE: 1
PHYSICAL_TRANSFERS_DEVICES: ROLLATOR
PREPARING MEALS: DEPENDENT

## 2025-01-21 ASSESSMENT — ENCOUNTER SYMPTOMS
DENIES PAIN: 1
PERSON REPORTING PAIN: PATIENT

## 2025-01-23 ENCOUNTER — HOME CARE VISIT (OUTPATIENT)
Dept: HOME HEALTH SERVICES | Facility: HOME HEALTH | Age: OVER 89
End: 2025-01-23
Payer: MEDICARE

## 2025-01-23 VITALS — OXYGEN SATURATION: 97 % | HEART RATE: 84 BPM

## 2025-01-23 PROCEDURE — G0152 HHCP-SERV OF OT,EA 15 MIN: HCPCS

## 2025-01-23 ASSESSMENT — ENCOUNTER SYMPTOMS
PERSON REPORTING PAIN: PATIENT
DENIES PAIN: 1

## 2025-01-24 ENCOUNTER — HOME CARE VISIT (OUTPATIENT)
Dept: HOME HEALTH SERVICES | Facility: HOME HEALTH | Age: OVER 89
End: 2025-01-24
Payer: MEDICARE

## 2025-01-24 VITALS — SYSTOLIC BLOOD PRESSURE: 104 MMHG | DIASTOLIC BLOOD PRESSURE: 58 MMHG | TEMPERATURE: 99 F | HEART RATE: 67 BPM

## 2025-01-24 PROCEDURE — G0157 HHC PT ASSISTANT EA 15: HCPCS | Mod: CQ

## 2025-01-24 SDOH — HEALTH STABILITY: PHYSICAL HEALTH
EXERCISE COMMENTS: COMPLETED SUPINE AP, QS, GS, HEEL SLIDES, LEG PRESSES, SLR AND BRIDGES WITH INSTRUCTIONS/REINFORCEMENT FOR ALIGNMENT AND PACING, BREATHING OUT WITH EXERTION 15X.

## 2025-01-27 ENCOUNTER — HOME CARE VISIT (OUTPATIENT)
Dept: HOME HEALTH SERVICES | Facility: HOME HEALTH | Age: OVER 89
End: 2025-01-27
Payer: MEDICARE

## 2025-01-27 VITALS — OXYGEN SATURATION: 100 % | HEART RATE: 102 BPM

## 2025-01-27 PROCEDURE — G0157 HHC PT ASSISTANT EA 15: HCPCS | Mod: CQ

## 2025-01-27 SDOH — HEALTH STABILITY: PHYSICAL HEALTH
EXERCISE COMMENTS: COMPLETED STANDING HEEL RAISE, MARCHES, 10X3 SETS WITH REINFORCEMENT FOR UPRIGHT POSTURE AND SUPPORT OF THE WALKER MAINTAINING COG OVER BOS WHILE SELF-MONITORING FOR TOLERANCE AND TECHNIQUE.

## 2025-01-29 ENCOUNTER — HOME CARE VISIT (OUTPATIENT)
Dept: HOME HEALTH SERVICES | Facility: HOME HEALTH | Age: OVER 89
End: 2025-01-29
Payer: MEDICARE

## 2025-01-29 PROCEDURE — G0152 HHCP-SERV OF OT,EA 15 MIN: HCPCS

## 2025-01-29 PROCEDURE — G0157 HHC PT ASSISTANT EA 15: HCPCS | Mod: CQ

## 2025-01-29 SDOH — HEALTH STABILITY: PHYSICAL HEALTH
EXERCISE COMMENTS: COMPLETED STANDING HEEL RAISE, MARCHES, MINI-SQUATS 15X WITH TWO HANDED SUPPORT OF THE WALKER AND INSTRUCTIONS/DEMONSTRATION FOR ALIGNMENT AND PACING, BREATHING OUT WITH EXERTION.

## 2025-01-29 ASSESSMENT — ACTIVITIES OF DAILY LIVING (ADL)
TOILETING: 1
TOILETING: INDEPENDENT
CURRENT_FUNCTION: SUPERVISION
AMBULATION ASSISTANCE: 1
AMBULATION ASSISTANCE: SUPERVISION
PHYSICAL TRANSFERS ASSESSED: 1
PHYSICAL_TRANSFERS_DEVICES: ROLLATOR

## 2025-01-29 ASSESSMENT — ENCOUNTER SYMPTOMS
PERSON REPORTING PAIN: PATIENT
DENIES PAIN: 1

## 2025-01-31 ENCOUNTER — HOME CARE VISIT (OUTPATIENT)
Dept: HOME HEALTH SERVICES | Facility: HOME HEALTH | Age: OVER 89
End: 2025-01-31
Payer: MEDICARE

## 2025-01-31 PROCEDURE — G0152 HHCP-SERV OF OT,EA 15 MIN: HCPCS

## 2025-01-31 ASSESSMENT — ACTIVITIES OF DAILY LIVING (ADL)
PREPARING MEALS: INDEPENDENT
PHYSICAL TRANSFERS ASSESSED: 1
AMBULATION ASSISTANCE: SUPERVISION
AMBULATION ASSISTANCE: 1
PHYSICAL_TRANSFERS_DEVICES: ROLLATOR
CURRENT_FUNCTION: SUPERVISION

## 2025-01-31 ASSESSMENT — ENCOUNTER SYMPTOMS
PERSON REPORTING PAIN: PATIENT
DENIES PAIN: 1

## 2025-02-03 ENCOUNTER — HOME CARE VISIT (OUTPATIENT)
Dept: HOME HEALTH SERVICES | Facility: HOME HEALTH | Age: OVER 89
End: 2025-02-03
Payer: MEDICARE

## 2025-02-03 VITALS — TEMPERATURE: 98.5 F | OXYGEN SATURATION: 93 % | HEART RATE: 68 BPM

## 2025-02-03 PROCEDURE — G0157 HHC PT ASSISTANT EA 15: HCPCS | Mod: CQ

## 2025-02-03 SDOH — HEALTH STABILITY: PHYSICAL HEALTH
EXERCISE COMMENTS: COMPLETED STANDING HEEL RAISE, MARCHES 20X EACH WITH REINFORCEMENT FOR UPRIGHT POSTURE AND ACTIVE VENTILATION WITH ACTIVITY USING TWO HANDED SUPPORT.

## 2025-02-04 ENCOUNTER — HOME CARE VISIT (OUTPATIENT)
Dept: HOME HEALTH SERVICES | Facility: HOME HEALTH | Age: OVER 89
End: 2025-02-04
Payer: MEDICARE

## 2025-02-04 PROCEDURE — G0152 HHCP-SERV OF OT,EA 15 MIN: HCPCS

## 2025-02-04 ASSESSMENT — ACTIVITIES OF DAILY LIVING (ADL)
CURRENT_FUNCTION: INDEPENDENT
TOILETING: INDEPENDENT
AMBULATION ASSISTANCE: 1
PREPARING MEALS: INDEPENDENT
PHYSICAL_TRANSFERS_DEVICES: ROLLATOR
TOILETING: 1
PHYSICAL TRANSFERS ASSESSED: 1
AMBULATION ASSISTANCE: INDEPENDENT

## 2025-02-04 ASSESSMENT — ENCOUNTER SYMPTOMS
DENIES PAIN: 1
PERSON REPORTING PAIN: PATIENT

## 2025-02-05 ENCOUNTER — HOME CARE VISIT (OUTPATIENT)
Dept: HOME HEALTH SERVICES | Facility: HOME HEALTH | Age: OVER 89
End: 2025-02-05
Payer: MEDICARE

## 2025-02-05 PROCEDURE — G0151 HHCP-SERV OF PT,EA 15 MIN: HCPCS

## 2025-02-05 SDOH — HEALTH STABILITY: PHYSICAL HEALTH: EXERCISE TYPE: BLE PER HEP

## 2025-02-05 ASSESSMENT — ACTIVITIES OF DAILY LIVING (ADL)
PHYSICAL TRANSFERS ASSESSED: 1
AMBULATION ASSISTANCE: INDEPENDENT
AMBULATION ASSISTANCE ON FLAT SURFACES: 1
AMBULATION ASSISTANCE: 1
HOME_HEALTH_OASIS: 00
OASIS_M1830: 00
AMBULATION_DISTANCE/DURATION_TOLERATED: 150'
CURRENT_FUNCTION: INDEPENDENT

## 2025-02-05 ASSESSMENT — ENCOUNTER SYMPTOMS
DENIES PAIN: 1
PERSON REPORTING PAIN: PATIENT

## 2025-02-08 LAB
ATRIAL RATE: 97 BPM
Q ONSET: 207 MS
QRS COUNT: 15 BEATS
QRS DURATION: 112 MS
QT INTERVAL: 376 MS
QTC CALCULATION(BAZETT): 464 MS
QTC FREDERICIA: 433 MS
R AXIS: 5 DEGREES
T AXIS: 99 DEGREES
T OFFSET: 395 MS
VENTRICULAR RATE: 92 BPM

## 2025-02-09 LAB
ATRIAL RATE: 83 BPM
P AXIS: 44 DEGREES
P OFFSET: 167 MS
P ONSET: 118 MS
PR INTERVAL: 190 MS
Q ONSET: 213 MS
QRS COUNT: 13 BEATS
QRS DURATION: 92 MS
QT INTERVAL: 392 MS
QTC CALCULATION(BAZETT): 460 MS
QTC FREDERICIA: 437 MS
R AXIS: 9 DEGREES
T AXIS: 65 DEGREES
T OFFSET: 409 MS
VENTRICULAR RATE: 83 BPM